# Patient Record
Sex: FEMALE | Race: WHITE | NOT HISPANIC OR LATINO
[De-identification: names, ages, dates, MRNs, and addresses within clinical notes are randomized per-mention and may not be internally consistent; named-entity substitution may affect disease eponyms.]

---

## 2017-01-30 ENCOUNTER — LABORATORY RESULT (OUTPATIENT)
Age: 22
End: 2017-01-30

## 2017-01-30 ENCOUNTER — APPOINTMENT (OUTPATIENT)
Dept: ENDOCRINOLOGY | Facility: CLINIC | Age: 22
End: 2017-01-30

## 2017-01-30 VITALS
DIASTOLIC BLOOD PRESSURE: 78 MMHG | SYSTOLIC BLOOD PRESSURE: 122 MMHG | HEART RATE: 81 BPM | OXYGEN SATURATION: 97 % | HEIGHT: 68.7 IN

## 2017-01-30 RX ORDER — DOCUSATE SODIUM 100 MG/1
100 CAPSULE, LIQUID FILLED ORAL
Qty: 60 | Refills: 0 | Status: COMPLETED | COMMUNITY
Start: 2016-09-23

## 2017-01-30 RX ORDER — PANTOPRAZOLE 20 MG/1
20 TABLET, DELAYED RELEASE ORAL
Qty: 30 | Refills: 0 | Status: COMPLETED | COMMUNITY
Start: 2016-09-23

## 2017-01-30 RX ORDER — METHYLPREDNISOLONE 4 MG/1
4 TABLET ORAL
Qty: 21 | Refills: 0 | Status: COMPLETED | COMMUNITY
Start: 2016-09-25

## 2017-01-30 RX ORDER — HYDROCODONE BITARTRATE AND ACETAMINOPHEN 10; 300 MG/1; MG/1
10-300 TABLET ORAL
Qty: 90 | Refills: 0 | Status: COMPLETED | COMMUNITY
Start: 2016-08-29

## 2017-01-30 RX ORDER — OXYCODONE AND ACETAMINOPHEN 5; 325 MG/1; MG/1
5-325 TABLET ORAL
Qty: 90 | Refills: 0 | Status: COMPLETED | COMMUNITY
Start: 2016-09-23

## 2017-01-30 RX ORDER — ERGOCALCIFEROL 1.25 MG/1
1.25 MG CAPSULE, LIQUID FILLED ORAL
Qty: 4 | Refills: 0 | Status: COMPLETED | COMMUNITY
Start: 2016-09-12

## 2017-01-30 RX ORDER — DIAZEPAM 2 MG/1
2 TABLET ORAL
Qty: 30 | Refills: 0 | Status: COMPLETED | COMMUNITY
Start: 2016-09-23

## 2017-01-30 RX ORDER — SENNOSIDES 8.6 MG
8.6 TABLET ORAL
Qty: 30 | Refills: 0 | Status: COMPLETED | COMMUNITY
Start: 2016-09-23

## 2017-01-31 LAB
25(OH)D3 SERPL-MCNC: 15.8 NG/ML
ALBUMIN SERPL ELPH-MCNC: 4.5 G/DL
ALP BLD-CCNC: 61 U/L
ALT SERPL-CCNC: 28 U/L
ANION GAP SERPL CALC-SCNC: 17 MMOL/L
AST SERPL-CCNC: 23 U/L
BASOPHILS # BLD AUTO: 0.02 K/UL
BASOPHILS NFR BLD AUTO: 0.2 %
BILIRUB SERPL-MCNC: 0.2 MG/DL
BUN SERPL-MCNC: 16 MG/DL
CALCIUM SERPL-MCNC: 10.3 MG/DL
CALCIUM SERPL-MCNC: 10.3 MG/DL
CHLORIDE SERPL-SCNC: 100 MMOL/L
CO2 SERPL-SCNC: 24 MMOL/L
CREAT SERPL-MCNC: 0.77 MG/DL
EOSINOPHIL # BLD AUTO: 0.12 K/UL
EOSINOPHIL NFR BLD AUTO: 1.1 %
ESTRADIOL SERPL-MCNC: 15 PG/ML
FSH SERPL-MCNC: 8.2 IU/L
GLUCOSE SERPL-MCNC: 96 MG/DL
HBA1C MFR BLD HPLC: 5.4 %
HCT VFR BLD CALC: 41.2 %
HGB BLD-MCNC: 13.6 G/DL
IMM GRANULOCYTES NFR BLD AUTO: 0.2 %
LH SERPL-ACNC: 16.6 IU/L
LYMPHOCYTES # BLD AUTO: 2.42 K/UL
LYMPHOCYTES NFR BLD AUTO: 22.3 %
MAN DIFF?: NORMAL
MCHC RBC-ENTMCNC: 28.1 PG
MCHC RBC-ENTMCNC: 33 GM/DL
MCV RBC AUTO: 85.1 FL
MONOCYTES # BLD AUTO: 0.5 K/UL
MONOCYTES NFR BLD AUTO: 4.6 %
NEUTROPHILS # BLD AUTO: 7.78 K/UL
NEUTROPHILS NFR BLD AUTO: 71.6 %
PARATHYROID HORMONE INTACT: 74 PG/ML
PLATELET # BLD AUTO: 221 K/UL
POTASSIUM SERPL-SCNC: 4.3 MMOL/L
PROT SERPL-MCNC: 7.4 G/DL
RBC # BLD: 4.84 M/UL
RBC # FLD: 14.1 %
SODIUM SERPL-SCNC: 141 MMOL/L
T3RU NFR SERPL: 1.01 INDEX
T4 SERPL-MCNC: 9.6 UG/DL
TSH SERPL-ACNC: 0.73 UIU/ML
WBC # FLD AUTO: 10.86 K/UL

## 2017-03-15 ENCOUNTER — RX RENEWAL (OUTPATIENT)
Age: 22
End: 2017-03-15

## 2017-03-16 ENCOUNTER — RX RENEWAL (OUTPATIENT)
Age: 22
End: 2017-03-16

## 2017-05-07 ENCOUNTER — EMERGENCY (EMERGENCY)
Facility: HOSPITAL | Age: 22
LOS: 0 days | Discharge: HOME | End: 2017-05-07
Attending: EMERGENCY MEDICINE
Payer: COMMERCIAL

## 2017-05-07 VITALS
HEIGHT: 69 IN | WEIGHT: 283.96 LBS | OXYGEN SATURATION: 99 % | DIASTOLIC BLOOD PRESSURE: 80 MMHG | TEMPERATURE: 99 F | SYSTOLIC BLOOD PRESSURE: 142 MMHG | RESPIRATION RATE: 18 BRPM | HEART RATE: 83 BPM

## 2017-05-07 DIAGNOSIS — S70.362A INSECT BITE (NONVENOMOUS), LEFT THIGH, INITIAL ENCOUNTER: ICD-10-CM
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
## 2017-05-07 DIAGNOSIS — Z88.0 ALLERGY STATUS TO PENICILLIN: ICD-10-CM

## 2017-05-07 PROCEDURE — 99282 EMERGENCY DEPT VISIT SF MDM: CPT

## 2017-05-07 NOTE — ED PROVIDER NOTE - PHYSICAL EXAMINATION
GEN: Alert, NAD  HEAD: atraumatic, EOMI, PERRL, normal lids/conjunctiva  ENT: normal hearing, patent oropharynx without erythema/exudate, uvula midline  NECK: +supple, no tenderness/meningismus, +Trachea midline  PULM: Bilateral BS, normal resp effort, no wheeze/stridor/retractions  CV: RRR, no M/R/G, +dist ext pulses  ABD: soft, NT/ND  BACK: no CVAT, no midline tenderness  MSK: no edema/erythema/cyanosis  SKIN: no rash  NEURO: AAOx3, no sensory/motor deficits, CN 2-12 intact  undress pt and check for any ticks, none found, no insect bites found

## 2017-05-07 NOTE — ED PROVIDER NOTE - PRESENTING SYMPTOMS DETAILS
21F w hx of leukemia in remission, thyroid dz, hip surg comes in after finding a tick on her L thigh, not sure if she got bit. no rashes/fevers/  ROS: No fever/chills, No photophobia/eye pain/changes in vision, No ear pain/sore throat/dysphagia, No chest pain/palpitations, no SOB/cough/wheeze/stridor, No abdominal pain/N/V/D, no dysuria/frequency/discharge, No neck/back pain, no rash, no changes in neurological status/function.

## 2017-05-07 NOTE — ED ADULT NURSE NOTE - OBJECTIVE STATEMENT
patient stated that she believed that she was bit by a tick and she wants to make sure that everything is ok, no tick noted to left thigh

## 2017-05-07 NOTE — ED ADULT NURSE NOTE - PMH
ALL (acute lymphoblastic leukemia)    Avascular necrosis    Hepatic steatosis    Herniated disc    OCD (obsessive compulsive disorder)    PCOS (polycystic ovarian syndrome)

## 2017-05-07 NOTE — ED PROVIDER NOTE - MEDICAL DECISION MAKING DETAILS
not sure if pt had a tick bite per pt. no rashes. pt is aalready on clarithromycin which covers for lyme and has 7 more days to go (Taking it for sinus infx). pt appears well and non-toxic. . VSS. Sx have improved during ED stay. No acute events during ED observation period. Precautions given to return to the ED if sx persist or change. Pt expresses understanding and has no further questions. Pt feels comfortable and wishes to be discharged home. Instructed to follow up with PMD in 24 hrs.

## 2017-05-23 ENCOUNTER — APPOINTMENT (OUTPATIENT)
Dept: ENDOCRINOLOGY | Facility: CLINIC | Age: 22
End: 2017-05-23

## 2017-05-23 NOTE — ED ADULT TRIAGE NOTE - TEMPERATURE IN FAHRENHEIT (DEGREES F)
Visit Information Date & Time Provider Department Dept. Phone Encounter #  
 5/23/2017  9:00 AM Hallie Arevalo NP [address] [facility] at [facility] [phone] [id number] Your Appointments 5/30/2017  9:00 AM  
ESTABLISHED PATIENT with Jose Vazquez MD  
[address] [facility] at [facility]) Appt Note: onc f/u  
 [address] [facility]  
[phone]  
  
   
 [address]  
  
    
 6/7/2017  8:15 AM  
Any with Hallie Arevalo NP [address] [facility] at [facility]) Appt Note: chemo day [address] [facility]  
[phone]  
  
   
 [address]  
  
    
 6/21/2017  8:15 AM  
Any with Hallie Arevalo NP [address] [facility] at [facility]) Appt Note: chemo day [address] [facility]  
[phone] Upcoming Health Maintenance Date Due Hepatitis C Screening 1946 DTaP/Tdap/Td series (1 - Tdap) 9/17/1966 ZOSTER VACCINE AGE 60> 9/17/2005 GLAUCOMA SCREENING Q2Y 9/17/2010 OSTEOPOROSIS SCREENING (DEXA) 9/17/2010 MEDICARE YEARLY EXAM 9/17/2010 Pneumococcal 65+ High/Highest Risk (2 of 2 - PPSV23) 12/27/2016 BREAST CANCER SCRN MAMMOGRAM 3/9/2017 INFLUENZA AGE 9 TO ADULT 8/1/2017 FOBT Q 1 YEAR AGE 50-75 8/21/2017 Allergies as of 5/23/2017  Review Complete On: 5/23/2017 By: Hallie Arevalo NP Severity Noted Reaction Type Reactions Beef Containing Products High 01/21/2016    Anaphylaxis Heparin (Porcine) High 11/11/2016    Hives Heparin beef starts with hives and can lead to anaphylatic shock  Iodinated Contrast Media - Oral And Iv Dye High 11/16/2016    Rash, Nausea and Vomiting, Other (comments) Abdominal pain Pork/porcine Containing Products High 01/21/2016    Anaphylaxis Augmentin [Amoxicillin-pot Clavulanate] Medium 03/14/2017   Topical Rash Full body rash, NV Bactrim [Sulfamethoprim Ds] Medium 05/01/2013   Side Effect Hives Erythromycin Medium 05/01/2013   Side Effect Hives, Itching Ciprofloxacin  05/01/2013   Intolerance Unknown (comments) Splitting headache Macrobid [Nitrofurantoin Monohyd/m-cryst]  05/01/2015    Other (comments) Headaches, bad dreams Oxaliplatin  04/25/2017    Other (comments) Vocal Cord Paralysis Current Immunizations  Reviewed on 5/10/2017 Name Date Pneumococcal Conjugate (PCV-13) 11/1/2016 Not reviewed this visit You Were Diagnosed With   
  
 Codes Comments Metastatic colon cancer in female [facility])    -  Primary ICD-10-CM: C78.5 ICD-9-CM: 197.5 Facial rash     ICD-10-CM: R21 
ICD-9-CM: 782.1 Vitals BP Pulse Temp Resp Height(growth percentile) Weight(growth percentile) 103/71 76 98.3 °F (36.8 °C) (Oral) 16 5' 5\" (1.651 m) 149 lb (67.6 kg) SpO2 BMI OB Status Smoking Status 99% 24.79 kg/m2 Postmenopausal Former Smoker Vitals History BMI and BSA Data Body Mass Index Body Surface Area 24.79 kg/m 2 1.76 m 2 Preferred Pharmacy Pharmacy Name Phone [facility] [address] [place] [address] [phone] Your Updated Medication List  
  
   
This list is accurate as of: 5/23/17  4:00 PM.  Always use your most recent med list.  
  
  
  
  
 acetaminophen 500 mg tablet Commonly known as:  TYLENOL Take  by mouth every six (6) hours as needed for Pain. ALLEGRA-D 12 HOUR  mg Tb12 Generic drug:  fexofenadine-pseudoephedrine Take 1 Tab by mouth every twelve (12) hours. fluticasone 50 mcg/actuation nasal spray Commonly known as:  Creasie Derwood 2 Sprays by Both Nostrils route daily. methylPREDNISolone 4 mg tablet Commonly known as:  Odalis Will Take as directed  
  
 oxyCODONE IR 5 mg immediate release tablet Commonly known as:  Cierra Cardona Take 1 Tab by mouth every four (4) hours as needed for Pain. Max Daily Amount: 30 mg.  
  
 potassium chloride 20 mEq tablet Commonly known as:  K-DUR, KLOR-CON Take 1 Tab by mouth two (2) times a day. Prescriptions Sent to Pharmacy Refills  
 methylPREDNISolone (MEDROL DOSEPACK) 4 mg tablet 0 Sig: Take as directed Class: Normal  
 Pharmacy: [facility] [address], [address] [place] [address] Ph #: [phone] To-Do List   
 06/07/2017 8:00 AM  
  Appointment with [address] at [facility] ([phone])  
  
 06/09/2017 3:00 PM  
  Appointment with CECELIA FAST TRACK/FLOAT NURSE at [facility] ([phone])  
  
 06/21/2017 8:00 AM  
  Appointment with [address] at [facility] ([phone])  
  
 06/23/2017 3:00 PM  
  Appointment with CECELIA FAST TRACK/FLOAT NURSE at [facility] ([phone]) Introducing [facility] SERVICES! Dear Paola Guan: 
Thank you for requesting a [employer] account. Our records indicate that you already have an active [employer] account. You can access your account anytime at [email or web address] Did you know that you can access your hospital and ER discharge instructions at any time in [employer]? You can also review all of your test results from your hospital stay or ER visit. Additional Information If you have questions, please visit the Frequently Asked Questions section of the Easy [employer] website at [email or web address] Remember, Easy Ice is NOT to be used for urgent needs. For medical emergencies, dial 911. Now available from your iPhone and Android! Please provide this summary of care documentation to your next provider. Your primary care clinician is listed as Bairon Huynh. If you have any questions after today's visit, please call [phone]. 98.7

## 2017-07-13 ENCOUNTER — RX RENEWAL (OUTPATIENT)
Age: 22
End: 2017-07-13

## 2017-08-20 ENCOUNTER — EMERGENCY (EMERGENCY)
Facility: HOSPITAL | Age: 22
LOS: 0 days | Discharge: HOME | End: 2017-08-20
Attending: EMERGENCY MEDICINE
Payer: MEDICAID

## 2017-08-20 VITALS
RESPIRATION RATE: 16 BRPM | WEIGHT: 259.93 LBS | HEIGHT: 69 IN | OXYGEN SATURATION: 96 % | SYSTOLIC BLOOD PRESSURE: 152 MMHG | DIASTOLIC BLOOD PRESSURE: 91 MMHG | TEMPERATURE: 99 F | HEART RATE: 98 BPM

## 2017-08-20 DIAGNOSIS — K76.0 FATTY (CHANGE OF) LIVER, NOT ELSEWHERE CLASSIFIED: ICD-10-CM

## 2017-08-20 DIAGNOSIS — Z88.0 ALLERGY STATUS TO PENICILLIN: ICD-10-CM

## 2017-08-20 DIAGNOSIS — J02.9 ACUTE PHARYNGITIS, UNSPECIFIED: ICD-10-CM

## 2017-08-20 DIAGNOSIS — R07.0 PAIN IN THROAT: ICD-10-CM

## 2017-08-20 DIAGNOSIS — F42.9 OBSESSIVE-COMPULSIVE DISORDER, UNSPECIFIED: ICD-10-CM

## 2017-08-20 DIAGNOSIS — Z88.8 ALLERGY STATUS TO OTHER DRUGS, MEDICAMENTS AND BIOLOGICAL SUBSTANCES STATUS: ICD-10-CM

## 2017-08-20 DIAGNOSIS — C91.00 ACUTE LYMPHOBLASTIC LEUKEMIA NOT HAVING ACHIEVED REMISSION: ICD-10-CM

## 2017-08-20 DIAGNOSIS — E28.2 POLYCYSTIC OVARIAN SYNDROME: ICD-10-CM

## 2017-08-20 PROCEDURE — 99283 EMERGENCY DEPT VISIT LOW MDM: CPT

## 2017-08-20 NOTE — ED PROVIDER NOTE - ENMT, MLM
Airway patent, Nasal mucosa clear. Mouth with normal mucosa. Throat has no vesicles, + oropharyngeal exudates and erythema. Uvula is midline.

## 2018-02-20 ENCOUNTER — LABORATORY RESULT (OUTPATIENT)
Age: 23
End: 2018-02-20

## 2018-02-20 ENCOUNTER — APPOINTMENT (OUTPATIENT)
Dept: ENDOCRINOLOGY | Facility: CLINIC | Age: 23
End: 2018-02-20
Payer: COMMERCIAL

## 2018-02-20 VITALS
BODY MASS INDEX: 42.44 KG/M2 | WEIGHT: 280 LBS | HEART RATE: 88 BPM | HEIGHT: 68 IN | DIASTOLIC BLOOD PRESSURE: 74 MMHG | SYSTOLIC BLOOD PRESSURE: 120 MMHG | OXYGEN SATURATION: 98 %

## 2018-02-20 PROCEDURE — 99214 OFFICE O/P EST MOD 30 MIN: CPT

## 2018-02-20 RX ORDER — NORETHINDRONE ACETATE AND ETHINYL ESTRADIOL 1; 20 MG/1; UG/1
1-20 TABLET ORAL DAILY
Qty: 90 | Refills: 2 | Status: DISCONTINUED | COMMUNITY
Start: 2017-03-16 | End: 2018-02-20

## 2018-02-20 RX ORDER — LIRAGLUTIDE 6 MG/ML
18 INJECTION, SOLUTION SUBCUTANEOUS
Qty: 5 | Refills: 3 | Status: DISCONTINUED | COMMUNITY
Start: 2017-01-30 | End: 2018-02-20

## 2018-02-21 LAB
25(OH)D3 SERPL-MCNC: 18.9 NG/ML
ALBUMIN SERPL ELPH-MCNC: 4.6 G/DL
ALP BLD-CCNC: 75 U/L
ALT SERPL-CCNC: 18 U/L
ANION GAP SERPL CALC-SCNC: 15 MMOL/L
AST SERPL-CCNC: 17 U/L
BASOPHILS # BLD AUTO: 0.02 K/UL
BASOPHILS NFR BLD AUTO: 0.2 %
BILIRUB SERPL-MCNC: 0.3 MG/DL
BUN SERPL-MCNC: 12 MG/DL
CALCIUM SERPL-MCNC: 10.5 MG/DL
CALCIUM SERPL-MCNC: 10.5 MG/DL
CHLORIDE SERPL-SCNC: 102 MMOL/L
CO2 SERPL-SCNC: 28 MMOL/L
CREAT SERPL-MCNC: 0.73 MG/DL
EOSINOPHIL # BLD AUTO: 0.13 K/UL
EOSINOPHIL NFR BLD AUTO: 1.3 %
GLUCOSE SERPL-MCNC: 92 MG/DL
HCT VFR BLD CALC: 42.4 %
HGB BLD-MCNC: 14.1 G/DL
IMM GRANULOCYTES NFR BLD AUTO: 0.2 %
LYMPHOCYTES # BLD AUTO: 1.79 K/UL
LYMPHOCYTES NFR BLD AUTO: 17.8 %
MAN DIFF?: NORMAL
MCHC RBC-ENTMCNC: 28.7 PG
MCHC RBC-ENTMCNC: 33.3 GM/DL
MCV RBC AUTO: 86.2 FL
MONOCYTES # BLD AUTO: 0.58 K/UL
MONOCYTES NFR BLD AUTO: 5.8 %
NEUTROPHILS # BLD AUTO: 7.51 K/UL
NEUTROPHILS NFR BLD AUTO: 74.7 %
PARATHYROID HORMONE INTACT: 80 PG/ML
PLATELET # BLD AUTO: 256 K/UL
POTASSIUM SERPL-SCNC: 4.8 MMOL/L
PROT SERPL-MCNC: 7.9 G/DL
RBC # BLD: 4.92 M/UL
RBC # FLD: 13.2 %
SODIUM SERPL-SCNC: 145 MMOL/L
T3RU NFR SERPL: 1.23 INDEX
T4 SERPL-MCNC: 7.5 UG/DL
TSH SERPL-ACNC: 1.77 UIU/ML
WBC # FLD AUTO: 10.05 K/UL

## 2018-05-15 ENCOUNTER — RX RENEWAL (OUTPATIENT)
Age: 23
End: 2018-05-15

## 2018-12-19 PROBLEM — E28.2 POLYCYSTIC OVARIAN SYNDROME: Chronic | Status: ACTIVE | Noted: 2017-05-07

## 2019-01-22 ENCOUNTER — EMERGENCY (EMERGENCY)
Facility: HOSPITAL | Age: 24
LOS: 0 days | Discharge: HOME | End: 2019-01-22
Attending: EMERGENCY MEDICINE
Payer: COMMERCIAL

## 2019-01-22 VITALS
HEART RATE: 94 BPM | WEIGHT: 285.06 LBS | TEMPERATURE: 98 F | HEIGHT: 69 IN | DIASTOLIC BLOOD PRESSURE: 99 MMHG | RESPIRATION RATE: 20 BRPM | OXYGEN SATURATION: 98 % | SYSTOLIC BLOOD PRESSURE: 153 MMHG

## 2019-01-22 DIAGNOSIS — Z79.52 LONG TERM (CURRENT) USE OF SYSTEMIC STEROIDS: ICD-10-CM

## 2019-01-22 DIAGNOSIS — F42.9 OBSESSIVE-COMPULSIVE DISORDER, UNSPECIFIED: ICD-10-CM

## 2019-01-22 DIAGNOSIS — C91.00 ACUTE LYMPHOBLASTIC LEUKEMIA NOT HAVING ACHIEVED REMISSION: ICD-10-CM

## 2019-01-22 DIAGNOSIS — M54.5 LOW BACK PAIN: ICD-10-CM

## 2019-01-22 DIAGNOSIS — E28.2 POLYCYSTIC OVARIAN SYNDROME: ICD-10-CM

## 2019-01-22 DIAGNOSIS — Z88.0 ALLERGY STATUS TO PENICILLIN: ICD-10-CM

## 2019-01-22 DIAGNOSIS — M54.17 RADICULOPATHY, LUMBOSACRAL REGION: ICD-10-CM

## 2019-01-22 DIAGNOSIS — Z96.643 PRESENCE OF ARTIFICIAL HIP JOINT, BILATERAL: ICD-10-CM

## 2019-01-22 DIAGNOSIS — Z79.1 LONG TERM (CURRENT) USE OF NON-STEROIDAL ANTI-INFLAMMATORIES (NSAID): ICD-10-CM

## 2019-01-22 PROCEDURE — 99283 EMERGENCY DEPT VISIT LOW MDM: CPT

## 2019-01-22 RX ORDER — KETOROLAC TROMETHAMINE 30 MG/ML
30 SYRINGE (ML) INJECTION ONCE
Qty: 0 | Refills: 0 | Status: DISCONTINUED | OUTPATIENT
Start: 2019-01-22 | End: 2019-01-22

## 2019-01-22 RX ORDER — METHOCARBAMOL 500 MG/1
1 TABLET, FILM COATED ORAL
Qty: 10 | Refills: 0 | OUTPATIENT
Start: 2019-01-22 | End: 2019-01-26

## 2019-01-22 RX ORDER — METHOCARBAMOL 500 MG/1
750 TABLET, FILM COATED ORAL ONCE
Qty: 0 | Refills: 0 | Status: COMPLETED | OUTPATIENT
Start: 2019-01-22 | End: 2019-01-22

## 2019-01-22 RX ADMIN — METHOCARBAMOL 750 MILLIGRAM(S): 500 TABLET, FILM COATED ORAL at 14:03

## 2019-01-22 RX ADMIN — Medication 30 MILLIGRAM(S): at 14:03

## 2019-01-22 NOTE — ED ADULT NURSE NOTE - OBJECTIVE STATEMENT
Patient complains of lower back pain, pt has a history of herniated disc and spinal stenosis, bilateral knee, hips and shoulder replacement, hx of leukemia and pcos. 2 back surguries. patient complains of lower back pain. denies pregnancy

## 2019-01-22 NOTE — ED PROVIDER NOTE - MUSCULOSKELETAL, MLM
Spine appears normal, range of motion is not limited, + bilateral paravertebral mid lumbar tenderness

## 2019-01-22 NOTE — ED ADULT TRIAGE NOTE - CHIEF COMPLAINT QUOTE
pt complaining of lower back pain since Sunday. pt has history of 2 back surgeries. denies trauma. states pain medication she was prescribed is not working.

## 2019-01-22 NOTE — ED PROVIDER NOTE - CARE PROVIDER_API CALL
Viviana Pineda), Neurology  [facility] [place], [place] [id number]  Phone: [phone]  Fax: [phone]

## 2019-02-25 ENCOUNTER — RX RENEWAL (OUTPATIENT)
Age: 24
End: 2019-02-25

## 2019-02-27 ENCOUNTER — RX RENEWAL (OUTPATIENT)
Age: 24
End: 2019-02-27

## 2019-03-26 ENCOUNTER — EMERGENCY (EMERGENCY)
Facility: HOSPITAL | Age: 24
LOS: 0 days | Discharge: HOME | End: 2019-03-26
Attending: EMERGENCY MEDICINE
Payer: COMMERCIAL

## 2019-03-26 VITALS
DIASTOLIC BLOOD PRESSURE: 85 MMHG | RESPIRATION RATE: 18 BRPM | WEIGHT: 287.04 LBS | TEMPERATURE: 98 F | HEIGHT: 69 IN | SYSTOLIC BLOOD PRESSURE: 127 MMHG | OXYGEN SATURATION: 99 % | HEART RATE: 92 BPM

## 2019-03-26 VITALS
HEART RATE: 81 BPM | SYSTOLIC BLOOD PRESSURE: 133 MMHG | TEMPERATURE: 98 F | RESPIRATION RATE: 18 BRPM | DIASTOLIC BLOOD PRESSURE: 83 MMHG | OXYGEN SATURATION: 99 %

## 2019-03-26 DIAGNOSIS — M87.9 OSTEONECROSIS, UNSPECIFIED: ICD-10-CM

## 2019-03-26 DIAGNOSIS — S39.012A STRAIN OF MUSCLE, FASCIA AND TENDON OF LOWER BACK, INITIAL ENCOUNTER: ICD-10-CM

## 2019-03-26 DIAGNOSIS — C91.00 ACUTE LYMPHOBLASTIC LEUKEMIA NOT HAVING ACHIEVED REMISSION: ICD-10-CM

## 2019-03-26 DIAGNOSIS — E28.2 POLYCYSTIC OVARIAN SYNDROME: ICD-10-CM
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
## 2019-03-26 DIAGNOSIS — Z96.643 PRESENCE OF ARTIFICIAL HIP JOINT, BILATERAL: ICD-10-CM

## 2019-03-26 DIAGNOSIS — Z88.0 ALLERGY STATUS TO PENICILLIN: ICD-10-CM

## 2019-03-26 DIAGNOSIS — K76.0 FATTY (CHANGE OF) LIVER, NOT ELSEWHERE CLASSIFIED: ICD-10-CM

## 2019-03-26 DIAGNOSIS — F42.9 OBSESSIVE-COMPULSIVE DISORDER, UNSPECIFIED: ICD-10-CM
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
## 2019-03-26 DIAGNOSIS — M54.9 DORSALGIA, UNSPECIFIED: ICD-10-CM

## 2019-03-26 LAB — HCG SERPL-ACNC: <1 MIU/ML — SIGNIFICANT CHANGE UP

## 2019-03-26 PROCEDURE — 99284 EMERGENCY DEPT VISIT MOD MDM: CPT

## 2019-03-26 PROCEDURE — 71045 X-RAY EXAM CHEST 1 VIEW: CPT | Mod: 26

## 2019-03-26 RX ORDER — CYCLOBENZAPRINE HYDROCHLORIDE 10 MG/1
1 TABLET, FILM COATED ORAL
Qty: 15 | Refills: 0
Start: 2019-03-26 | End: 2019-03-30

## 2019-03-26 RX ORDER — IBUPROFEN 200 MG
600 TABLET ORAL ONCE
Qty: 0 | Refills: 0 | Status: COMPLETED | OUTPATIENT
Start: 2019-03-26 | End: 2019-03-26

## 2019-03-26 RX ORDER — TRAMADOL HYDROCHLORIDE 50 MG/1
1 TABLET ORAL
Qty: 12 | Refills: 0 | OUTPATIENT
Start: 2019-03-26 | End: 2019-03-28

## 2019-03-26 RX ORDER — MORPHINE SULFATE 50 MG/1
4 CAPSULE, EXTENDED RELEASE ORAL ONCE
Qty: 0 | Refills: 0 | Status: DISCONTINUED | OUTPATIENT
Start: 2019-03-26 | End: 2019-03-26

## 2019-03-26 RX ORDER — ENOXAPARIN SODIUM 100 MG/ML
0 INJECTION SUBCUTANEOUS
Qty: 0 | Refills: 0 | COMMUNITY

## 2019-03-26 RX ORDER — KETOROLAC TROMETHAMINE 30 MG/ML
30 SYRINGE (ML) INJECTION ONCE
Qty: 0 | Refills: 0 | Status: DISCONTINUED | OUTPATIENT
Start: 2019-03-26 | End: 2019-03-26

## 2019-03-26 RX ORDER — IBUPROFEN 200 MG
1 TABLET ORAL
Qty: 15 | Refills: 0
Start: 2019-03-26 | End: 2019-03-30

## 2019-03-26 RX ORDER — TRAMADOL HYDROCHLORIDE 50 MG/1
50 TABLET ORAL ONCE
Qty: 0 | Refills: 0 | Status: DISCONTINUED | OUTPATIENT
Start: 2019-03-26 | End: 2019-03-26

## 2019-03-26 RX ORDER — METHOCARBAMOL 500 MG/1
1000 TABLET, FILM COATED ORAL ONCE
Qty: 0 | Refills: 0 | Status: COMPLETED | OUTPATIENT
Start: 2019-03-26 | End: 2019-03-26

## 2019-03-26 RX ADMIN — METHOCARBAMOL 1000 MILLIGRAM(S): 500 TABLET, FILM COATED ORAL at 11:19

## 2019-03-26 RX ADMIN — Medication 600 MILLIGRAM(S): at 11:19

## 2019-03-26 RX ADMIN — Medication 30 MILLIGRAM(S): at 13:41

## 2019-03-26 RX ADMIN — MORPHINE SULFATE 4 MILLIGRAM(S): 50 CAPSULE, EXTENDED RELEASE ORAL at 12:04

## 2019-03-26 NOTE — ED PROVIDER NOTE - OBJECTIVE STATEMENT
23 years old female walked in with mom c/o right upper back pain after pull her back while wearing her bra this morning. Pt also sts pain increases with movements and breathing. Pt denies headache, dizziness, blurred visions, light sensitivities, focal/distal weakness or numbness, cough, chest pain, nausea, vomiting, fever, chills, abd pain, dysuria, vaginal spotting or discharge or irregular bowel movement.

## 2019-03-26 NOTE — ED PROVIDER NOTE - PROGRESS NOTE DETAILS
Pt is alert and oriented x 3 sts she is feeling better denies headache, dizziness, sob cough, chest pain, nausea, vomiting, abd pain. Pt ans mom are given and explained the cxr reports and advised to follow up with her doctor as soon as possible.

## 2019-03-26 NOTE — ED PROVIDER NOTE - MUSCULOSKELETAL MINIMAL EXAM
MUSCLE SPASMS/right trapezius muscle tender, Pt is able actively abduct/adduct/flex/extend all joints of bilateral upper extremities against resistance/neck supple

## 2019-03-26 NOTE — ED ADULT NURSE NOTE - OBJECTIVE STATEMENT
Cancer as a child, developed AVN, bilateral hips, bilateral shoulders, and right knee replaced. Patient states having 2 lumber spinal surgeries in the past. Patient c/o of back/ neck pain that began yesterday. Patient states painful to walk. PMH involves cancer as a child, developed AVN, bilateral hips, bilateral shoulders, and right knee replaced. Patient states having 2 lumber spinal surgeries in the past.

## 2019-03-26 NOTE — ED PROVIDER NOTE - CONSTITUTIONAL, MLM
normal... Well appearing, well nourished, awake, alert, oriented to person, place, time/situation and in no apparent distress. Speaking in clear full sentences no  nasal flaring shoulders retractions no diaphoresis appears very comfortable lying in the stretcher in a bright light room.

## 2019-04-04 ENCOUNTER — EMERGENCY (EMERGENCY)
Facility: HOSPITAL | Age: 24
LOS: 0 days | Discharge: HOME | End: 2019-04-04
Attending: EMERGENCY MEDICINE
Payer: COMMERCIAL

## 2019-04-04 VITALS
DIASTOLIC BLOOD PRESSURE: 94 MMHG | TEMPERATURE: 98 F | HEIGHT: 69 IN | HEART RATE: 90 BPM | WEIGHT: 287.04 LBS | RESPIRATION RATE: 20 BRPM | SYSTOLIC BLOOD PRESSURE: 123 MMHG | OXYGEN SATURATION: 99 %

## 2019-04-04 VITALS
OXYGEN SATURATION: 99 % | HEART RATE: 76 BPM | RESPIRATION RATE: 18 BRPM | SYSTOLIC BLOOD PRESSURE: 146 MMHG | TEMPERATURE: 98 F | DIASTOLIC BLOOD PRESSURE: 88 MMHG

## 2019-04-04 DIAGNOSIS — R11.0 NAUSEA: ICD-10-CM

## 2019-04-04 DIAGNOSIS — M54.5 LOW BACK PAIN: ICD-10-CM

## 2019-04-04 LAB
ALBUMIN SERPL ELPH-MCNC: 3.5 G/DL — SIGNIFICANT CHANGE UP (ref 3.3–5)
ALP SERPL-CCNC: 65 U/L — SIGNIFICANT CHANGE UP (ref 40–120)
ALT FLD-CCNC: 18 U/L — SIGNIFICANT CHANGE UP (ref 12–78)
ANION GAP SERPL CALC-SCNC: 9 MMOL/L — SIGNIFICANT CHANGE UP (ref 5–17)
APPEARANCE UR: ABNORMAL
APTT BLD: 30.2 SEC — SIGNIFICANT CHANGE UP (ref 28.5–37)
AST SERPL-CCNC: 15 U/L — SIGNIFICANT CHANGE UP (ref 15–37)
BACTERIA # UR AUTO: ABNORMAL
BASOPHILS # BLD AUTO: 0.03 K/UL — SIGNIFICANT CHANGE UP (ref 0–0.2)
BASOPHILS NFR BLD AUTO: 0.4 % — SIGNIFICANT CHANGE UP (ref 0–2)
BILIRUB SERPL-MCNC: 0.3 MG/DL — SIGNIFICANT CHANGE UP (ref 0.2–1.2)
BILIRUB UR-MCNC: NEGATIVE — SIGNIFICANT CHANGE UP
BUN SERPL-MCNC: 15 MG/DL — SIGNIFICANT CHANGE UP (ref 7–23)
CALCIUM SERPL-MCNC: 8.7 MG/DL — SIGNIFICANT CHANGE UP (ref 8.5–10.1)
CHLORIDE SERPL-SCNC: 105 MMOL/L — SIGNIFICANT CHANGE UP (ref 96–108)
CO2 SERPL-SCNC: 26 MMOL/L — SIGNIFICANT CHANGE UP (ref 22–31)
COLOR SPEC: YELLOW — SIGNIFICANT CHANGE UP
CREAT SERPL-MCNC: 0.72 MG/DL — SIGNIFICANT CHANGE UP (ref 0.5–1.3)
DIFF PNL FLD: ABNORMAL
EOSINOPHIL # BLD AUTO: 0.14 K/UL — SIGNIFICANT CHANGE UP (ref 0–0.5)
EOSINOPHIL NFR BLD AUTO: 1.6 % — SIGNIFICANT CHANGE UP (ref 0–6)
EPI CELLS # UR: ABNORMAL
GLUCOSE SERPL-MCNC: 80 MG/DL — SIGNIFICANT CHANGE UP (ref 70–99)
GLUCOSE UR QL: NEGATIVE MG/DL — SIGNIFICANT CHANGE UP
HCG SERPL-ACNC: <1 MIU/ML — SIGNIFICANT CHANGE UP
HCT VFR BLD CALC: 40.3 % — SIGNIFICANT CHANGE UP (ref 34.5–45)
HGB BLD-MCNC: 13.3 G/DL — SIGNIFICANT CHANGE UP (ref 11.5–15.5)
IMM GRANULOCYTES NFR BLD AUTO: 0.2 % — SIGNIFICANT CHANGE UP (ref 0–1.5)
INR BLD: 1.06 RATIO — SIGNIFICANT CHANGE UP (ref 0.88–1.16)
KETONES UR-MCNC: NEGATIVE — SIGNIFICANT CHANGE UP
LEUKOCYTE ESTERASE UR-ACNC: ABNORMAL
LYMPHOCYTES # BLD AUTO: 1.95 K/UL — SIGNIFICANT CHANGE UP (ref 1–3.3)
LYMPHOCYTES # BLD AUTO: 22.9 % — SIGNIFICANT CHANGE UP (ref 13–44)
MCHC RBC-ENTMCNC: 28.5 PG — SIGNIFICANT CHANGE UP (ref 27–34)
MCHC RBC-ENTMCNC: 33 GM/DL — SIGNIFICANT CHANGE UP (ref 32–36)
MCV RBC AUTO: 86.3 FL — SIGNIFICANT CHANGE UP (ref 80–100)
MONOCYTES # BLD AUTO: 0.35 K/UL — SIGNIFICANT CHANGE UP (ref 0–0.9)
MONOCYTES NFR BLD AUTO: 4.1 % — SIGNIFICANT CHANGE UP (ref 2–14)
NEUTROPHILS # BLD AUTO: 6.03 K/UL — SIGNIFICANT CHANGE UP (ref 1.8–7.4)
NEUTROPHILS NFR BLD AUTO: 70.8 % — SIGNIFICANT CHANGE UP (ref 43–77)
NITRITE UR-MCNC: NEGATIVE — SIGNIFICANT CHANGE UP
NRBC # BLD: 0 /100 WBCS — SIGNIFICANT CHANGE UP (ref 0–0)
PH UR: 5 — SIGNIFICANT CHANGE UP (ref 5–8)
PLATELET # BLD AUTO: 221 K/UL — SIGNIFICANT CHANGE UP (ref 150–400)
POTASSIUM SERPL-MCNC: 4.1 MMOL/L — SIGNIFICANT CHANGE UP (ref 3.5–5.3)
POTASSIUM SERPL-SCNC: 4.1 MMOL/L — SIGNIFICANT CHANGE UP (ref 3.5–5.3)
PROT SERPL-MCNC: 7.5 GM/DL — SIGNIFICANT CHANGE UP (ref 6–8.3)
PROT UR-MCNC: NEGATIVE MG/DL — SIGNIFICANT CHANGE UP
PROTHROM AB SERPL-ACNC: 11.9 SEC — SIGNIFICANT CHANGE UP (ref 10–12.9)
RBC # BLD: 4.67 M/UL — SIGNIFICANT CHANGE UP (ref 3.8–5.2)
RBC # FLD: 12.5 % — SIGNIFICANT CHANGE UP (ref 10.3–14.5)
RBC CASTS # UR COMP ASSIST: SIGNIFICANT CHANGE UP /HPF (ref 0–4)
SODIUM SERPL-SCNC: 140 MMOL/L — SIGNIFICANT CHANGE UP (ref 135–145)
SP GR SPEC: 1.02 — SIGNIFICANT CHANGE UP (ref 1.01–1.02)
UROBILINOGEN FLD QL: NEGATIVE MG/DL — SIGNIFICANT CHANGE UP
WBC # BLD: 8.52 K/UL — SIGNIFICANT CHANGE UP (ref 3.8–10.5)
WBC # FLD AUTO: 8.52 K/UL — SIGNIFICANT CHANGE UP (ref 3.8–10.5)
WBC UR QL: ABNORMAL

## 2019-04-04 PROCEDURE — 99285 EMERGENCY DEPT VISIT HI MDM: CPT | Mod: 25

## 2019-04-04 PROCEDURE — 72131 CT LUMBAR SPINE W/O DYE: CPT | Mod: 26

## 2019-04-04 RX ORDER — TRAMADOL HYDROCHLORIDE 50 MG/1
50 TABLET ORAL ONCE
Qty: 0 | Refills: 0 | Status: DISCONTINUED | OUTPATIENT
Start: 2019-04-04 | End: 2019-04-04

## 2019-04-04 RX ORDER — MORPHINE SULFATE 50 MG/1
4 CAPSULE, EXTENDED RELEASE ORAL ONCE
Qty: 0 | Refills: 0 | Status: DISCONTINUED | OUTPATIENT
Start: 2019-04-04 | End: 2019-04-04

## 2019-04-04 RX ORDER — METOCLOPRAMIDE HCL 10 MG
10 TABLET ORAL ONCE
Qty: 0 | Refills: 0 | Status: COMPLETED | OUTPATIENT
Start: 2019-04-04 | End: 2019-04-04

## 2019-04-04 RX ORDER — KETOROLAC TROMETHAMINE 30 MG/ML
30 SYRINGE (ML) INJECTION ONCE
Qty: 0 | Refills: 0 | Status: DISCONTINUED | OUTPATIENT
Start: 2019-04-04 | End: 2019-04-04

## 2019-04-04 RX ORDER — PANTOPRAZOLE SODIUM 20 MG/1
40 TABLET, DELAYED RELEASE ORAL ONCE
Qty: 0 | Refills: 0 | Status: COMPLETED | OUTPATIENT
Start: 2019-04-04 | End: 2019-04-04

## 2019-04-04 RX ORDER — SODIUM CHLORIDE 9 MG/ML
1000 INJECTION INTRAMUSCULAR; INTRAVENOUS; SUBCUTANEOUS ONCE
Qty: 0 | Refills: 0 | Status: COMPLETED | OUTPATIENT
Start: 2019-04-04 | End: 2019-04-04

## 2019-04-04 RX ADMIN — MORPHINE SULFATE 4 MILLIGRAM(S): 50 CAPSULE, EXTENDED RELEASE ORAL at 08:06

## 2019-04-04 RX ADMIN — MORPHINE SULFATE 4 MILLIGRAM(S): 50 CAPSULE, EXTENDED RELEASE ORAL at 10:37

## 2019-04-04 RX ADMIN — SODIUM CHLORIDE 1000 MILLILITER(S): 9 INJECTION INTRAMUSCULAR; INTRAVENOUS; SUBCUTANEOUS at 07:28

## 2019-04-04 RX ADMIN — SODIUM CHLORIDE 1000 MILLILITER(S): 9 INJECTION INTRAMUSCULAR; INTRAVENOUS; SUBCUTANEOUS at 10:37

## 2019-04-04 RX ADMIN — Medication 30 MILLIGRAM(S): at 11:36

## 2019-04-04 RX ADMIN — Medication 10 MILLIGRAM(S): at 08:06

## 2019-04-04 RX ADMIN — MORPHINE SULFATE 4 MILLIGRAM(S): 50 CAPSULE, EXTENDED RELEASE ORAL at 11:36

## 2019-04-04 RX ADMIN — PANTOPRAZOLE SODIUM 40 MILLIGRAM(S): 20 TABLET, DELAYED RELEASE ORAL at 08:06

## 2019-04-04 NOTE — ED PROVIDER NOTE - MUSCULOSKELETAL MINIMAL EXAM
left para lumbar muscle tender to palp L3 toL5 with old midline surgical scar/TENDERNESS/normal range of motion/neck supple/motor intact

## 2019-04-04 NOTE — ED PROVIDER NOTE - CONSTITUTIONAL, MLM
normal... Well appearing, well nourished, awake, alert, oriented to person, place, time/situation and in no apparent distress. Speaking in clear full sentences no nasal flaring no shoulders retractions no diaphoresis not holding her head/chest/abdomen/back, appears very comfortable lying in the stretcher but able to turn her body to the side without difficulty

## 2019-04-04 NOTE — ED PROVIDER NOTE - NONTENDER LOCATION
right lower quadrant/periumbilical/left costovertebral angle/left lower quadrant/suprapubic/left upper quadrant/right upper quadrant/umbilical/right costovertebral angle

## 2019-04-04 NOTE — ED PROVIDER NOTE - PROGRESS NOTE DETAILS
Pt is ambulating with normal gaits without assists. Pt and mom are given and explained all test reports and advised to follow up with her doctor as soon as possible Pt has no urinary symptoms and no oral antibiotic is indicated now.

## 2019-04-04 NOTE — ED ADULT NURSE NOTE - NSIMPLEMENTINTERV_GEN_ALL_ED
Implemented All Universal Safety Interventions:  [place] to call system. Call bell, personal items and telephone within reach. Instruct patient to call for assistance. Room bathroom lighting operational. Non-slip footwear when patient is off stretcher. Physically safe environment: no spills, clutter or unnecessary equipment. Stretcher in lowest position, wheels locked, appropriate side rails in place.

## 2019-04-04 NOTE — ED PROVIDER NOTE - OBJECTIVE STATEMENT
23 years old female with mom c/o mid lower back pain without radiation  st since two mornings ago increases with movements and very nauseous this morning. Pt sts the pain started when she was getting out ot the couch two mornings ago. Pt sts she has a hx of AVN dur to ALL in the childhood and she is in remission. Pt denies recent hx of trauma, headache, dizziness, blurred visions, light sensitivities, focal/distal weakness or numbness, cough, sob, chest pain, vomiting, fever, chills, abdominal pain, dysuria, hematuria, vaginal spotting or discharge or irregular bowel movements. Pt sts she is not at risk of being pregnant. Pt sts she has a hx of lumbar surgery in the past.

## 2019-04-04 NOTE — ED ADULT NURSE REASSESSMENT NOTE - NS ED NURSE REASSESS COMMENT FT1
Pt received in bed in report alert and oriented from JUNIE middleton. As per MD's orders IV brittni placed blood specimen obtained and sent to the lab. Meds given and tolerated well. Nursing care ongoing and safety maintained.

## 2019-04-04 NOTE — ED ADULT TRIAGE NOTE - CHIEF COMPLAINT QUOTE
Pt c/o intermittent lower back pain, midline, x 2-3 days. hx of AVN secondary to treatment for ALL as a child. No recent trauma. No dysuria or hematuria, fevers or chills. No hx of kidney stones. Took diclofenac and skelaxin at home without relief.

## 2019-04-05 LAB
CULTURE RESULTS: SIGNIFICANT CHANGE UP
SPECIMEN SOURCE: SIGNIFICANT CHANGE UP

## 2019-04-09 ENCOUNTER — APPOINTMENT (OUTPATIENT)
Dept: ENDOCRINOLOGY | Facility: CLINIC | Age: 24
End: 2019-04-09
Payer: COMMERCIAL

## 2019-04-09 VITALS
BODY MASS INDEX: 42.51 KG/M2 | HEART RATE: 86 BPM | HEIGHT: 69 IN | SYSTOLIC BLOOD PRESSURE: 104 MMHG | WEIGHT: 287 LBS | OXYGEN SATURATION: 98 % | DIASTOLIC BLOOD PRESSURE: 76 MMHG

## 2019-04-09 VITALS — BODY MASS INDEX: 42.51 KG/M2 | HEIGHT: 69 IN | WEIGHT: 287 LBS

## 2019-04-09 DIAGNOSIS — N91.5 OLIGOMENORRHEA, UNSPECIFIED: ICD-10-CM

## 2019-04-09 PROCEDURE — 99214 OFFICE O/P EST MOD 30 MIN: CPT

## 2019-04-09 RX ORDER — VENLAFAXINE HCL 50 MG
TABLET ORAL
Refills: 0 | Status: ACTIVE | COMMUNITY

## 2019-04-09 NOTE — PHYSICAL EXAM
[Alert] : alert [No Acute Distress] : no acute distress [Normal Sclera/Conjunctiva] : normal sclera/conjunctiva [PERRL] : pupils equal, round and reactive to light [No Neck Mass] : no neck mass was observed [Normal Oropharynx] : the oropharynx was normal [No LAD] : no lymphadenopathy [Thyroid Not Enlarged] : the thyroid was not enlarged [Supple] : the neck was supple [No Thyroid Nodules] : there were no palpable thyroid nodules [No Accessory Muscle Use] : no accessory muscle use [Clear to Auscultation] : lungs were clear to auscultation bilaterally [Regular Rhythm] : with a regular rhythm [Normal S1, S2] : normal S1 and S2 [Carotids Normal] : carotid pulses were normal with no bruits [Soft] : abdomen soft [Normal Bowel Sounds] : normal bowel sounds [Not Tender] : non-tender [Anterior Cervical Nodes] : anterior cervical nodes [Post Cervical Nodes] : posterior cervical nodes [Normal] : normal and non tender [Normal Gait] : normal gait [No Rash] : no rash [Normal Affect] : the affect was normal [No Tremors] : no tremors [Normal Mood] : the mood was normal [Acne] : no acne [Abdominal Striae] : no abdominal striae [Hirsutism] : no hirsutism [Acanthosis Nigricans] : no acanthosis nigricans [de-identified] : obese [de-identified] : thinning of scalp no loss of hairline or localized alopecia

## 2019-04-09 NOTE — ASSESSMENT
[FreeTextEntry1] : late f/u 23 year-old female with \par \par 1. PCOS: menses regular on OBCP. C/w metformin 750 mg BID. No acanthosis nigricans, no hirsutism. Pt has hair thinning with scalp visibility. Derm recommended Rogaine. I agree with this, no contradiction for thyroid. Pt can consider changing OBCP if she wants to see if this is impacting her hair loss. Will send standard labs, doubt androgen excess or Cushings\par  \par 2. Hypothyroidism: clinically and chemically euthyroid on LT4 150. No h/o nodules or goiter. \par \par 3. Obesity: tried Saxenda did not tolerate. Some wt loss in 2018. \par \par 4. H/o low Vit D: on 50,000 2x/wk. Unclear why her patterns are of malabsorption. All questions answered. Pt can increase dose to 3x/wk if values still low. \par \par Of note, pt has been admitted to the hospital twice for back complications in 2018. \par \par I request labs sent out in the morning to include cortisol to rule out adrenal dysfunction. \par \par f/u in 6 mons.  [Levothyroxine] : The patient was instructed to take Levothyroxine on an empty stomach, separate from vitamins, and wait at least 30 minutes before eating

## 2019-04-09 NOTE — REVIEW OF SYSTEMS
[Recent Weight Loss (___ Lbs)] : recent [unfilled] ~Ulb weight loss [Joint Pain] : joint pain [Depression] : depression [Anxiety] : anxiety [All other systems negative] : All other systems negative [Hair Loss] : hair loss [Fatigue] : no fatigue [Decreased Appetite] : appetite not decreased [Visual Field Defect] : no visual field defect [Dysphagia] : no dysphagia [Blurry Vision] : no blurred vision [Dysphonia] : no dysphonia [Chest Pain] : no chest pain [Palpitations] : no palpitations [Shortness Of Breath] : no shortness of breath [Nausea] : no nausea [Wheezing] : no wheezing was heard [Vomiting] : no vomiting was observed [Irregular Menses] : regular menses [Polyuria] : no polyuria [Acanthosis] : no acanthosis  [Hirsutism] : no hirsutism [Acne] : no acne [Ulcer] : no ulcer [Dry Skin] : no dry skin [Headache] : no headaches [Tremors] : no tremors [Heat Intolerance] : heat tolerant [Polydipsia] : no polydipsia [Cold Intolerance] : cold tolerant [Easy Bleeding] : no ~M tendency for easy bleeding [FreeTextEntry9] : chronic 2/2 h/o AVN

## 2019-04-09 NOTE — END OF VISIT
[FreeTextEntry3] : I, Nathalie Marsh, authored this note working as a medical scribe for Dr. Euceda.  04/09/2019.  1:45PM. \par This note was authored by Nathalie Marsh working as medical scribe for me. I have reviewed, edited, and revised the note as needed. I am in agreement with the exam findings, imaging findings, and treatment plan.  Devin Euceda MD

## 2019-04-09 NOTE — HISTORY OF PRESENT ILLNESS
[FreeTextEntry1] : late f/u for 22 y/o female with PCOS, hypothyroidism, and low vit D. \par \par H/o ALL dx at age 10 she is currently still in remission. Had AVN hips, knees, shoulders from chemoRx and steroids. Had R TKR at [facility] July 2015. No recent bone disease. Prior BMD normal. Had lumbar laminectomy Sept 2016 [facility]. Has back pain, sciatica to the R leg. On Vitamin D replacement. \par \par Menses regular on Ortho-Tricycken. \par c/o hair thinning. No major hirsutism. Saw derm, recommended Rogaine, did not start\par \par Gained 30 lbs in 2016. Tried Saxenda, stopped after few months. Wt in 2019 down from 2016. \par \par Hypothyroidism on LT4 150, no obvious hypothyroid sx.

## 2019-04-18 ENCOUNTER — LABORATORY RESULT (OUTPATIENT)
Age: 24
End: 2019-04-18

## 2019-04-19 LAB
25(OH)D3 SERPL-MCNC: 94.6 NG/ML
ALBUMIN SERPL ELPH-MCNC: 4.7 G/DL
ALP BLD-CCNC: 68 U/L
ALT SERPL-CCNC: 21 U/L
ANION GAP SERPL CALC-SCNC: 18 MMOL/L
AST SERPL-CCNC: 18 U/L
BILIRUB SERPL-MCNC: 0.4 MG/DL
BUN SERPL-MCNC: 14 MG/DL
CALCIUM SERPL-MCNC: 9.9 MG/DL
CALCIUM SERPL-MCNC: 9.9 MG/DL
CHLORIDE SERPL-SCNC: 101 MMOL/L
CO2 SERPL-SCNC: 23 MMOL/L
CREAT SERPL-MCNC: 0.51 MG/DL
DHEA-S SERPL-MCNC: 74.6 UG/DL
ESTIMATED AVERAGE GLUCOSE: 91 MG/DL
GLUCOSE SERPL-MCNC: 63 MG/DL
HBA1C MFR BLD HPLC: 4.8 %
MAGNESIUM SERPL-MCNC: 1.9 MG/DL
PARATHYROID HORMONE INTACT: 52 PG/ML
POTASSIUM SERPL-SCNC: 4.1 MMOL/L
PROT SERPL-MCNC: 7.3 G/DL
SODIUM SERPL-SCNC: 142 MMOL/L
T3RU NFR SERPL: 1.2 TBI
T4 SERPL-MCNC: 11.6 UG/DL
TESTOST SERPL-MCNC: 3.8 NG/DL
TSH SERPL-ACNC: 0.28 UIU/ML
VIT B12 SERPL-MCNC: 501 PG/ML

## 2019-04-23 LAB
CORTIS SAL-MCNC: NORMAL
SHBG SERPL-SCNC: 88 NMOL/L

## 2019-05-28 ENCOUNTER — RX RENEWAL (OUTPATIENT)
Age: 24
End: 2019-05-28

## 2019-06-11 ENCOUNTER — RX CHANGE (OUTPATIENT)
Age: 24
End: 2019-06-11

## 2019-08-02 NOTE — ED ADULT NURSE NOTE - CAS DISCH TRANSFER METHOD
Swelling that gets worse/Pain not relieved by Medications/Wound/Surgical Site with redness, or foul smelling discharge or pus/Numbness, tingling, color or temperature change to extremity/Bleeding that does not stop
Private car

## 2019-10-10 NOTE — ED ADULT TRIAGE NOTE - SPO2 (%)
NOTIFICATION RETURN TO WORK / SCHOOL 
 
10/10/2019 11:47 AM 
 
Ms. Yovany Kraft [facility] [address] To Whom It May Concern: 
 
Yovany Kraft is currently under the care of [facility]. She will return to work/school on: 10/10/2019, or until drainage free for 24 hours. If there are questions or concerns please have the patient contact our office.  
 
 
 
Sincerely, 
 
 
Janki Chau PA-C 
 
                                
 
 99

## 2019-10-28 ENCOUNTER — APPOINTMENT (OUTPATIENT)
Dept: ENDOCRINOLOGY | Facility: CLINIC | Age: 24
End: 2019-10-28
Payer: COMMERCIAL

## 2019-10-28 VITALS
HEIGHT: 69 IN | HEART RATE: 102 BPM | WEIGHT: 280 LBS | DIASTOLIC BLOOD PRESSURE: 80 MMHG | SYSTOLIC BLOOD PRESSURE: 130 MMHG | OXYGEN SATURATION: 98 % | BODY MASS INDEX: 41.47 KG/M2

## 2019-10-28 PROCEDURE — 99214 OFFICE O/P EST MOD 30 MIN: CPT

## 2019-10-28 RX ORDER — PANTOPRAZOLE 20 MG/1
20 TABLET, DELAYED RELEASE ORAL DAILY
Qty: 90 | Refills: 0 | Status: DISCONTINUED | COMMUNITY
Start: 2018-02-20 | End: 2019-10-28

## 2019-10-28 NOTE — HISTORY OF PRESENT ILLNESS
[FreeTextEntry1] : F/u for 25 y/o female with PCOS, hypothyroidism, and low vit D. \par \par H/o ALL dx at age 10 she is currently still in remission. Had AVN hips, knees, shoulders from chemoRx and steroids. [facility]. No recent bone disease. Prior BMD normal. Had lumbar laminectomy Sept 2016 [facility]. Has back pain, sciatica to the R leg. On Vitamin D replacement. \par \par Menses regular on Ortho-Tricycken. Previously c/o hair thinning. No major hirsutism. Saw derm, recommended Rogaine, did not start\par \par Gained 30 lbs in 2016. Tried Saxenda, stopped after few months. Wt in 2019 down from 2016. \par \par Hypothyroidism on LT4 150, no obvious hypothyroid sx.

## 2019-10-28 NOTE — REVIEW OF SYSTEMS
[Recent Weight Loss (___ Lbs)] : recent [unfilled] ~Ulb weight loss [Joint Pain] : joint pain [Hair Loss] : hair loss [Depression] : depression [Anxiety] : anxiety [All other systems negative] : All other systems negative [Fatigue] : no fatigue [Decreased Appetite] : appetite not decreased [Visual Field Defect] : no visual field defect [Blurry Vision] : no blurred vision [Dysphagia] : no dysphagia [Dysphonia] : no dysphonia [Chest Pain] : no chest pain [Palpitations] : no palpitations [Shortness Of Breath] : no shortness of breath [Wheezing] : no wheezing was heard [Nausea] : no nausea [Vomiting] : no vomiting was observed [Polyuria] : no polyuria [Irregular Menses] : regular menses [Acanthosis] : no acanthosis  [Hirsutism] : no hirsutism [Acne] : no acne [Dry Skin] : no dry skin [Ulcer] : no ulcer [Headache] : no headaches [Tremors] : no tremors [Polydipsia] : no polydipsia [Cold Intolerance] : cold tolerant [Heat Intolerance] : heat tolerant [Easy Bleeding] : no ~M tendency for easy bleeding [FreeTextEntry9] : chronic 2/2 h/o AVN

## 2019-10-28 NOTE — PHYSICAL EXAM
[Alert] : alert [No Acute Distress] : no acute distress [Normal Sclera/Conjunctiva] : normal sclera/conjunctiva [PERRL] : pupils equal, round and reactive to light [Normal Oropharynx] : the oropharynx was normal [No Neck Mass] : no neck mass was observed [Supple] : the neck was supple [No LAD] : no lymphadenopathy [Thyroid Not Enlarged] : the thyroid was not enlarged [No Thyroid Nodules] : there were no palpable thyroid nodules [No Accessory Muscle Use] : no accessory muscle use [Clear to Auscultation] : lungs were clear to auscultation bilaterally [Normal S1, S2] : normal S1 and S2 [Regular Rhythm] : with a regular rhythm [Carotids Normal] : carotid pulses were normal with no bruits [Normal Bowel Sounds] : normal bowel sounds [Not Tender] : non-tender [Soft] : abdomen soft [Post Cervical Nodes] : posterior cervical nodes [Anterior Cervical Nodes] : anterior cervical nodes [Normal] : normal and non tender [Normal Gait] : normal gait [No Rash] : no rash [No Tremors] : no tremors [Normal Affect] : the affect was normal [Normal Mood] : the mood was normal [Acne] : no acne [Abdominal Striae] : no abdominal striae [Hirsutism] : no hirsutism [Acanthosis Nigricans] : no acanthosis nigricans [de-identified] : obese [de-identified] : thinning of scalp no loss of hairline or localized alopecia

## 2019-10-28 NOTE — ASSESSMENT
[Levothyroxine] : The patient was instructed to take Levothyroxine on an empty stomach, separate from vitamins, and wait at least 30 minutes before eating [FreeTextEntry1] : F/u 24 year-old female with:\par \par 1. PCOS: Menses regular on OBCP. C/w metformin 750 mg BID. No acanthosis nigricans, no hirsutism. Pt has hair thinning with scalp visibility. Derm recommended Rogaine, continue Rogaine, no contradiction for thyroid. Previously recommended pt can consider changing OBCP if she wants to see if this is impacting her hair loss. Pt was advised to change her dermatologist as she is c/o extreme hair loss. \par  \par 2. Hypothyroidism: Clinically and chemically euthyroid on LT4 150. No h/o nodules or goiter. \par \par 3. Obesity: Tried Saxenda did not tolerate. Some wt loss in 2018. Current wt stable.\par \par 4. H/o low Vit D: Pt on 50,000 3x/wk. Unclear why her patterns are of malabsorption. Pt was recommended to reduce it to 2x/week.\par \par Of note, pt has been admitted to the hospital twice for back complications in 2018. H/o herniated disc.\par \par F/u in 6 mons.

## 2019-10-28 NOTE — END OF VISIT
[FreeTextEntry3] : I, Roderick Delaney, authored this note working as a medical scribe for Dr. Euceda.  10/28/2019.  5:15PM. This note was authored by the medical scribe for me. I have reviewed, edited, and revised the note as needed. I am in agreement with the exam findings, imaging findings, and treatment plan.  Devin Euceda MD

## 2020-01-14 ENCOUNTER — RX RENEWAL (OUTPATIENT)
Age: 25
End: 2020-01-14

## 2020-02-20 ENCOUNTER — EMERGENCY (EMERGENCY)
Facility: HOSPITAL | Age: 25
LOS: 0 days | Discharge: HOME | End: 2020-02-20
Attending: EMERGENCY MEDICINE
Payer: COMMERCIAL

## 2020-02-20 VITALS
OXYGEN SATURATION: 98 % | DIASTOLIC BLOOD PRESSURE: 87 MMHG | TEMPERATURE: 98 F | HEART RATE: 96 BPM | SYSTOLIC BLOOD PRESSURE: 140 MMHG | RESPIRATION RATE: 16 BRPM

## 2020-02-20 VITALS
SYSTOLIC BLOOD PRESSURE: 148 MMHG | OXYGEN SATURATION: 95 % | TEMPERATURE: 97 F | RESPIRATION RATE: 17 BRPM | WEIGHT: 270.07 LBS | HEIGHT: 69 IN | DIASTOLIC BLOOD PRESSURE: 88 MMHG | HEART RATE: 88 BPM

## 2020-02-20 DIAGNOSIS — Z88.0 ALLERGY STATUS TO PENICILLIN: ICD-10-CM

## 2020-02-20 DIAGNOSIS — Z88.8 ALLERGY STATUS TO OTHER DRUGS, MEDICAMENTS AND BIOLOGICAL SUBSTANCES STATUS: ICD-10-CM

## 2020-02-20 DIAGNOSIS — Z88.1 ALLERGY STATUS TO OTHER ANTIBIOTIC AGENTS STATUS: ICD-10-CM

## 2020-02-20 DIAGNOSIS — K08.89 OTHER SPECIFIED DISORDERS OF TEETH AND SUPPORTING STRUCTURES: ICD-10-CM

## 2020-02-20 PROCEDURE — 99283 EMERGENCY DEPT VISIT LOW MDM: CPT

## 2020-02-20 RX ORDER — OXYCODONE AND ACETAMINOPHEN 5; 325 MG/1; MG/1
2 TABLET ORAL ONCE
Refills: 0 | Status: DISCONTINUED | OUTPATIENT
Start: 2020-02-20 | End: 2020-02-20

## 2020-02-20 RX ADMIN — OXYCODONE AND ACETAMINOPHEN 2 TABLET(S): 5; 325 TABLET ORAL at 06:52

## 2020-02-20 RX ADMIN — OXYCODONE AND ACETAMINOPHEN 2 TABLET(S): 5; 325 TABLET ORAL at 06:19

## 2020-02-20 NOTE — ED ADULT NURSE NOTE - OBJECTIVE STATEMENT
Patient had wisdom tooth removal yesterday, very swollen and hurts. took motrin at 0100am pt present to the ED c/o toothache. Pt. had wisdom tooth removal yesterday, very swollen and hurts. pt states "took Motrin at 0100am". pt present to the ED c/o toothache. Pt. had wisdom tooth removal yesterday, very swollen and hurts. pt c/o left lower wisdom tooth removal and pain since yesterday. pt taking clindamycin and IBU. pt states "took Motrin at 0100am". pt also states she is unable to sleep at night. pt denies bleeding, denies drainage, denies drooling, denies SOB. no acute distress noted. will continue to monitor.

## 2020-02-20 NOTE — ED PROVIDER NOTE - NSFOLLOWUPINSTRUCTIONS_ED_ALL_ED_FT
please take medication as prescribed    follow up with your dentist    return to hospital if your symptoms worsen

## 2020-02-20 NOTE — ED PROVIDER NOTE - PHYSICAL EXAMINATION
Gen: Alert, speaking in complete sentences, c/o pain  Head: NC, AT, EOMI, normal lids/conjunctiva  ENT: normal hearing, patent oropharynx, MMM, no bleeding, no purulent drainage from site of tooth extraction, no drooling  Neck: supple, no tenderness/meningismus, Trachea midline  Pulm: Bilateral clear BS, normal resp effort, no wheeze/stridor/retractions  CV: RRR, no M/R/G, +dist pulses  Abd: soft, NT/ND, +BS, no guarding/rebound tenderness  Mskel: no edema/erythema/cyanosis  Skin: no rash  Neuro: no sensory/motor deficits

## 2020-02-20 NOTE — ED ADULT NURSE NOTE - NSIMPLEMENTINTERV_GEN_ALL_ED
Implemented All Universal Safety Interventions:  [place] to call system. Call bell, personal items and telephone within reach. Instruct patient to call for assistance. Room bathroom lighting operational. Non-slip footwear when patient is off stretcher. Physically safe environment: no spills, clutter or unnecessary equipment. Stretcher in lowest position, wheels locked, appropriate side rails in place.

## 2020-02-20 NOTE — ED PROVIDER NOTE - PATIENT PORTAL LINK FT
You can access the FollowMyHealth Patient Portal offered by [facility] by registering at the following website: [email or web address]. By joining [employer]’s FollowMyHealth portal, you will also be able to view your health information using other applications (apps) compatible with our system.

## 2020-02-20 NOTE — ED PROVIDER NOTE - CLINICAL SUMMARY MEDICAL DECISION MAKING FREE TEXT BOX
Pt w pain s/p tooth extraction, no bleeding/purulent drainage.  Pt given dose of pain meds in ED.  Pt given Rx and instructed/cautioned on use. Dc home to follow up w dentist & PMD, instructed to return to ED if sx worsen.

## 2020-02-20 NOTE — ED PROVIDER NOTE - OBJECTIVE STATEMENT
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  Pt states she has been compliant w Clindamycin & ibuprofen, but has been unable to sleep due to pain.  Denies bleeding, purulent drainage, drooling, SOB.    No fever/chills, No photophobia/eye pain/changes in vision, No ear pain/sore throat/dysphagia, No chest pain/palpitations, no SOB/cough/wheeze/stridor, No abdominal pain, No neck/back pain, no rash, no changes in neurological status/function.

## 2020-04-30 ENCOUNTER — APPOINTMENT (OUTPATIENT)
Dept: ENDOCRINOLOGY | Facility: CLINIC | Age: 25
End: 2020-04-30
Payer: COMMERCIAL

## 2020-04-30 VITALS
HEART RATE: 119 BPM | SYSTOLIC BLOOD PRESSURE: 120 MMHG | DIASTOLIC BLOOD PRESSURE: 90 MMHG | OXYGEN SATURATION: 98 % | HEIGHT: 69 IN | TEMPERATURE: 98.4 F | BODY MASS INDEX: 40.88 KG/M2 | WEIGHT: 276 LBS

## 2020-04-30 PROCEDURE — 99215 OFFICE O/P EST HI 40 MIN: CPT

## 2020-04-30 RX ORDER — SPIRONOLACTONE 50 MG/1
50 TABLET ORAL
Refills: 0 | Status: ACTIVE | COMMUNITY

## 2020-04-30 RX ORDER — ASCORBIC ACID 500 MG
TABLET ORAL
Refills: 0 | Status: ACTIVE | COMMUNITY

## 2020-04-30 RX ORDER — ETHYNODIOL DIACETATE AND ETHINYL ESTRADIOL 1 MG-50MCG
KIT ORAL
Refills: 0 | Status: ACTIVE | COMMUNITY

## 2020-04-30 RX ORDER — NORGESTIMATE AND ETHINYL ESTRADIOL 7DAYSX3 28
0.18/0.215/0.25 KIT ORAL
Qty: 3 | Refills: 3 | Status: DISCONTINUED | COMMUNITY
Start: 2017-01-30 | End: 2020-04-30

## 2020-04-30 NOTE — ASSESSMENT
[Levothyroxine] : The patient was instructed to take Levothyroxine on an empty stomach, separate from vitamins, and wait at least 30 minutes before eating [FreeTextEntry1] : F/u 24 year-old female with:\par \par 1. PCOS: Menses regular on OBCP. C/w metformin 750 mg BID. No acanthosis nigricans, no hirsutism. Pt has hair thinning with scalp visibility. Derm recommended Rogaine pt refused. Saw Dr Zadlivar, added Aldactone even though testosterone has been normal.. \par  \par 2. Hypothyroidism: Clinically and chemically euthyroid on LT4 150. No h/o nodules or goiter. \par \par 3. Obesity: Tried Saxenda in the past stopped due to unclear reasons.  Options of therapy including bariatric surgery discussed again.  Can retry Saxenda to see if patient will tolerate and is effective. \par 4. H/o low Vit D: Pt on 50,000 3x/wk. Unclear why her patterns are of malabsorption. check levels\par \par F/u in 3 mons.  [Incretin Mimetic Therapy] : Risks and benefits of incretin mimetic therapy were discussed with the patient including nauseau, pancreatitis and potential risk of medullary thyroid cancer

## 2020-04-30 NOTE — PHYSICAL EXAM
[Alert] : alert [Well Nourished] : well nourished [No Acute Distress] : no acute distress [Well Developed] : well developed [Normal Sclera/Conjunctiva] : normal sclera/conjunctiva [No Proptosis] : no proptosis [EOMI] : extra ocular movement intact [Normal Oropharynx] : the oropharynx was normal [Thyroid Not Enlarged] : the thyroid was not enlarged [No Thyroid Nodules] : no palpable thyroid nodules [Well Healed Scar] : well healed scar [No Respiratory Distress] : no respiratory distress [No Accessory Muscle Use] : no accessory muscle use [Clear to Auscultation] : lungs were clear to auscultation bilaterally [Normal S1, S2] : normal S1 and S2 [Normal Rate] : heart rate was normal [Regular Rhythm] : with a regular rhythm [No Edema] : no peripheral edema [Pedal Pulses Normal] : the pedal pulses are present [Normal Bowel Sounds] : normal bowel sounds [Not Distended] : not distended [Not Tender] : non-tender [Soft] : abdomen soft [Normal Anterior Cervical Nodes] : no anterior cervical lymphadenopathy [Normal Posterior Cervical Nodes] : no posterior cervical lymphadenopathy [No Spinal Tenderness] : no spinal tenderness [Spine Straight] : spine straight [Normal Gait] : normal gait [No Stigmata of Cushings Syndrome] : no stigmata of Cushings Syndrome [Normal Strength/Tone] : muscle strength and tone were normal [No Rash] : no rash [Acanthosis Nigricans] : no acanthosis nigricans [Hirsutism] : no hirsutism [Normal Reflexes] : deep tendon reflexes were 2+ and symmetric [Oriented x3] : oriented to person, place, and time [No Tremors] : no tremors [de-identified] : thinning hair crown

## 2020-04-30 NOTE — HISTORY OF PRESENT ILLNESS
[FreeTextEntry1] : F/u for 23 y/o female with PCOS, hypothyroidism, and low vit D. \par \par H/o ALL dx at age 10 she is currently still in remission. Had AVN hips, knees, shoulders from chemoRx and steroids. Had R TKR at [facility] July 2015. No recent bone disease. Prior BMD normal. Had lumbar laminectomy Sept 2016 [facility]. Has back pain, sciatica to the R leg. On Vitamin D replacement. \par \par Gained 30 lbs in 2016. Tried Saxenda, stopped after few months. Wt in 2019 down from 2016. \par changed OBCP to Jose, Menses regular\par Previously c/o hair thinning. No major hirsutism. Saw derm, recommended Rogaine, did not start saw  Micheal Zaldivar for hair loss, added apironolactone 50 qd, did not tolerate 200 mg \par Hypothyroidism on LT4 150, no obvious hypothyroid sx.

## 2020-05-04 ENCOUNTER — APPOINTMENT (OUTPATIENT)
Dept: PEDIATRIC HEMATOLOGY/ONCOLOGY | Facility: CLINIC | Age: 25
End: 2020-05-04
Payer: COMMERCIAL

## 2020-05-04 ENCOUNTER — RX RENEWAL (OUTPATIENT)
Age: 25
End: 2020-05-04

## 2020-05-04 DIAGNOSIS — Z09 ENCOUNTER FOR FOLLOW-UP EXAMINATION AFTER COMPLETED TREATMENT FOR CONDITIONS OTHER THAN MALIGNANT NEOPLASM: ICD-10-CM

## 2020-05-04 DIAGNOSIS — Z85.9 PERSONAL HISTORY OF MALIGNANT NEOPLASM, UNSPECIFIED: ICD-10-CM

## 2020-05-04 DIAGNOSIS — Z86.79 PERSONAL HISTORY OF OTHER DISEASES OF THE CIRCULATORY SYSTEM: ICD-10-CM

## 2020-05-04 DIAGNOSIS — Z86.39 PERSONAL HISTORY OF OTHER ENDOCRINE, NUTRITIONAL AND METABOLIC DISEASE: ICD-10-CM
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
## 2020-05-04 PROCEDURE — 99215 OFFICE O/P EST HI 40 MIN: CPT | Mod: 95

## 2020-05-05 DIAGNOSIS — K21.9 GASTRO-ESOPHAGEAL REFLUX DISEASE W/OUT ESOPHAGITIS: ICD-10-CM

## 2020-05-05 RX ORDER — PANTOPRAZOLE 20 MG/1
20 TABLET, DELAYED RELEASE ORAL DAILY
Qty: 90 | Refills: 1 | Status: ACTIVE | COMMUNITY
Start: 2020-05-05 | End: 1900-01-01

## 2020-05-05 NOTE — HISTORY OF PRESENT ILLNESS
[Home] : at home, [unfilled] , at the time of the visit. [Other Location: e.g. Home (Enter Location, City,State)___] : at [unfilled] [Patient] : the patient [Self] : self [de-identified] : [age] year-old female now 12 years off-therapy for acute lymphoblastic leukemia. Since her last survivorship visit in 2012, EFREN has been generally well, she had a spinal fusion in 2015 and a right knee replacement in 2016.  EFREN's post-treatment course has been complicated by avascular necrosis, PCOS, hair thinning, irregular menstrual cycles, hypothyroidism, obesity.  \par \par EFREN has been attending [employer], an online program for a BA in education  and has been performing well academically with an IEP in place. Efren has been living at home with her parents and brother and 3 dogs. EFREN has been getting minimal exercise, and reported having a generally healthy diet.\par  \par  [de-identified] : 75 [de-identified] : 100 [de-identified] : 3,000 [de-identified] : YES [de-identified] : YES [de-identified] : YES [de-identified] : YES [de-identified] : YES [de-identified] : YES [de-identified] : YES [de-identified] : YES [de-identified] : YES

## 2020-05-05 NOTE — REVIEW OF SYSTEMS
[Anxiety] : anxiety [Negative] : Allergic/Immunologic [FreeTextEntry9] : chronic back pain and limitations due to avascular necrosis. [de-identified] : hair thinning [de-identified] : a

## 2020-05-05 NOTE — PAST MEDICAL HISTORY
[Regular Cycle Intervals] : periods have been regular [FreeTextEntry2] : with use of oral contraceptives;

## 2020-05-05 NOTE — SOCIAL HISTORY
[Father] : father [Mother] : mother [IEP/504] : currently has an IEP/504 in place [Brother] : brother [College] : College [Oral Contraceptives] : oral contraceptives [FreeTextEntry1] : studying childhood education/special ed

## 2020-05-05 NOTE — PHYSICAL EXAM
[Obese] : obese [No focal deficits] : no focal deficits [Gait normal] : gait normal [Normal] : affect appropriate [100: Normal, no complaints, no evidence of disease.] : 100: Normal, no complaints, no evidence of disease. [de-identified] : limited exam-telehealth visit [de-identified] : limited exam-telehealth visit [de-identified] : limited exam-telehealth visit [de-identified] : limited exam-telehealth visit [de-identified] : limited ROM

## 2020-05-05 NOTE — CONSULT LETTER
[Dear  ___] : Dear  [unfilled], [Please see my note below.] : Please see my note below. [Courtesy Letter:] : I had the pleasure of seeing your patient, [unfilled], in my office today. [Consult Closing:] : Thank you very much for allowing me to participate in the care of this patient.  If you have any questions, please do not hesitate to contact me. [FreeTextEntry2] : Betty Bauman\par [address]\par [place] [address] \par  [FreeTextEntry3] : TRUPTI Navarro\par Family Nurse Practitioner \par [facility] \par Pediatric Hematology/Oncology\par [facility]\par [phone]\par \par \par   \par \par

## 2020-05-06 ENCOUNTER — LABORATORY RESULT (OUTPATIENT)
Age: 25
End: 2020-05-06

## 2020-05-07 LAB
25(OH)D3 SERPL-MCNC: 140 NG/ML
ALBUMIN SERPL ELPH-MCNC: 4.4 G/DL
ALP BLD-CCNC: 73 U/L
ALT SERPL-CCNC: 34 U/L
ANION GAP SERPL CALC-SCNC: 18 MMOL/L
AST SERPL-CCNC: 19 U/L
BILIRUB SERPL-MCNC: 0.3 MG/DL
BUN SERPL-MCNC: 10 MG/DL
CALCIUM SERPL-MCNC: 10.1 MG/DL
CHLORIDE SERPL-SCNC: 101 MMOL/L
CO2 SERPL-SCNC: 23 MMOL/L
CORTIS SERPL-MCNC: 21.1 UG/DL
CREAT SERPL-MCNC: 0.51 MG/DL
FERRITIN SERPL-MCNC: 1222 NG/ML
GLUCOSE SERPL-MCNC: 103 MG/DL
POTASSIUM SERPL-SCNC: 4.2 MMOL/L
PROT SERPL-MCNC: 7 G/DL
SODIUM SERPL-SCNC: 142 MMOL/L
T3 SERPL-MCNC: 187 NG/DL
T3RU NFR SERPL: 1.2 TBI
T4 SERPL-MCNC: 11.3 UG/DL
TESTOST SERPL-MCNC: 7.3 NG/DL
TSH SERPL-ACNC: 0.01 UIU/ML

## 2020-05-08 LAB — SHBG SERPL-SCNC: 168 NMOL/L

## 2020-05-11 LAB — 17OHP SERPL-MCNC: 12 NG/DL

## 2020-06-02 ENCOUNTER — RX RENEWAL (OUTPATIENT)
Age: 25
End: 2020-06-02

## 2020-07-20 ENCOUNTER — TRANSCRIPTION ENCOUNTER (OUTPATIENT)
Age: 25
End: 2020-07-20

## 2020-08-03 ENCOUNTER — APPOINTMENT (OUTPATIENT)
Dept: ENDOCRINOLOGY | Facility: CLINIC | Age: 25
End: 2020-08-03
Payer: COMMERCIAL

## 2020-08-03 ENCOUNTER — LABORATORY RESULT (OUTPATIENT)
Age: 25
End: 2020-08-03

## 2020-08-03 VITALS
DIASTOLIC BLOOD PRESSURE: 70 MMHG | HEIGHT: 69 IN | TEMPERATURE: 97.7 F | HEART RATE: 115 BPM | OXYGEN SATURATION: 98 % | SYSTOLIC BLOOD PRESSURE: 110 MMHG | BODY MASS INDEX: 41.03 KG/M2 | WEIGHT: 277 LBS

## 2020-08-03 PROCEDURE — 99214 OFFICE O/P EST MOD 30 MIN: CPT

## 2020-08-03 RX ORDER — ANTIARTHRITIC COMBINATION NO.2 900 MG
5000 TABLET ORAL
Refills: 0 | Status: DISCONTINUED | COMMUNITY
End: 2020-08-03

## 2020-08-03 RX ORDER — RANITIDINE HYDROCHLORIDE 300 MG/1
TABLET, FILM COATED ORAL
Refills: 0 | Status: DISCONTINUED | COMMUNITY
End: 2020-08-03

## 2020-08-03 RX ORDER — CHROMIUM 200 MCG
TABLET ORAL
Refills: 0 | Status: ACTIVE | COMMUNITY

## 2020-08-03 NOTE — HISTORY OF PRESENT ILLNESS
[FreeTextEntry1] :  \par \par H/o ALL dx at age 10 she is currently still in remission. Had AVN hips, knees, shoulders from chemoRx and steroids. Had R TKR at [facility] July 2015. No recent bone disease. Prior BMD normal. Had lumbar laminectomy Sept 2016 [facility]. Has back pain, sciatica to the R leg. On Vitamin D replacement. \par \par Gained 30 lbs in 2016. Tried Saxenda, stopped after few months. Wt in 2019 down from 2016.   Pt did not restart Saxenda. father was hosp with Covid, felt to upset, worse anxiety\par changed OBCP to [place], Menses regular\par Previously c/o hair thinning. No major hirsutism. Saw derm, recommended Rogaine, did not start saw  Micheal Zaldivar for hair loss, added apironolactone 50 qd, did not tolerate 200 mg \par Hypothyroidism on LT4 137 decreased from 150 for suppressed TSH no change in any clinical symptoms on change in dose.  Denies symptoms of hypothyroidism or hyperthyroidism.\par Blood test showed elevated ferritin.  Patient saw hematology and apparently had genetic studies suggesting heterozygous for hemochromatosis.  Recommended observation\par

## 2020-08-03 NOTE — ASSESSMENT
[Incretin Mimetic Therapy] : Risks and benefits of incretin mimetic therapy were discussed with the patient including nauseau, pancreatitis and potential risk of medullary thyroid cancer [Levothyroxine] : The patient was instructed to take Levothyroxine on an empty stomach, separate from vitamins, and wait at least 30 minutes before eating [FreeTextEntry1] : F/u 25 year-old female with:\par \par 1. PCOS: Menses regular on OBCP. C/w metformin 750 mg BID. No acanthosis nigricans, no hirsutism. Pt has hair thinning with scalp visibility. Derm recommended Rogaine pt refused. Saw Dr Zaldivar, added Aldactone even though testosterone has been normal.. \par  \par 2. Hypothyroidism: Clinically and chemically euthyroid on LT4 137  decreased from 1500. No h/o nodules or goiter. \par \par 3. Obesity: Tried Saxenda in the past stopped due to unclear reasons.  Options of therapy including bariatric surgery discussed again.  Can retry Saxenda to see if patient will tolerate and is effective. \par 4. H/o low Vit D: Pt on 50,000 3x/wk. Unclear why her patterns are of malabsorption. check levels\par \par F/u in 3 mons.

## 2020-08-03 NOTE — PHYSICAL EXAM
[Alert] : alert [Well Developed] : well developed [Well Nourished] : well nourished [No Acute Distress] : no acute distress [Normal Sclera/Conjunctiva] : normal sclera/conjunctiva [EOMI] : extra ocular movement intact [No Proptosis] : no proptosis [Normal Oropharynx] : the oropharynx was normal [No Respiratory Distress] : no respiratory distress [No Thyroid Nodules] : no palpable thyroid nodules [Thyroid Not Enlarged] : the thyroid was not enlarged [Clear to Auscultation] : lungs were clear to auscultation bilaterally [Normal S1, S2] : normal S1 and S2 [No Accessory Muscle Use] : no accessory muscle use [Regular Rhythm] : with a regular rhythm [Normal Rate] : heart rate was normal [No Edema] : no peripheral edema [Normal Bowel Sounds] : normal bowel sounds [Not Tender] : non-tender [Normal Anterior Cervical Nodes] : no anterior cervical lymphadenopathy [Not Distended] : not distended [Soft] : abdomen soft [Normal Posterior Cervical Nodes] : no posterior cervical lymphadenopathy [Spine Straight] : spine straight [No Spinal Tenderness] : no spinal tenderness [Normal Gait] : normal gait [No Stigmata of Cushings Syndrome] : no stigmata of Cushings Syndrome [Acanthosis Nigricans] : no acanthosis nigricans [Normal Strength/Tone] : muscle strength and tone were normal [No Rash] : no rash [No Tremors] : no tremors [Normal Reflexes] : deep tendon reflexes were 2+ and symmetric [Hirsutism] : no hirsutism [Oriented x3] : oriented to person, place, and time [de-identified] : right knee scar

## 2020-08-04 LAB
25(OH)D3 SERPL-MCNC: 68.3 NG/ML
ALBUMIN SERPL ELPH-MCNC: 4.7 G/DL
ALP BLD-CCNC: 71 U/L
ALT SERPL-CCNC: 14 U/L
ANION GAP SERPL CALC-SCNC: 17 MMOL/L
AST SERPL-CCNC: 14 U/L
BASOPHILS # BLD AUTO: 0.04 K/UL
BASOPHILS NFR BLD AUTO: 0.3 %
BILIRUB SERPL-MCNC: <0.2 MG/DL
BUN SERPL-MCNC: 10 MG/DL
CALCIUM SERPL-MCNC: 10.6 MG/DL
CHLORIDE SERPL-SCNC: 101 MMOL/L
CO2 SERPL-SCNC: 21 MMOL/L
CREAT SERPL-MCNC: 0.59 MG/DL
EOSINOPHIL # BLD AUTO: 0.2 K/UL
EOSINOPHIL NFR BLD AUTO: 1.6 %
GLUCOSE SERPL-MCNC: 94 MG/DL
HCT VFR BLD CALC: 43.9 %
HGB BLD-MCNC: 14.6 G/DL
IMM GRANULOCYTES NFR BLD AUTO: 0.3 %
LYMPHOCYTES # BLD AUTO: 2.17 K/UL
LYMPHOCYTES NFR BLD AUTO: 17.5 %
MAN DIFF?: NORMAL
MCHC RBC-ENTMCNC: 29.1 PG
MCHC RBC-ENTMCNC: 33.3 GM/DL
MCV RBC AUTO: 87.5 FL
MONOCYTES # BLD AUTO: 0.48 K/UL
MONOCYTES NFR BLD AUTO: 3.9 %
NEUTROPHILS # BLD AUTO: 9.49 K/UL
NEUTROPHILS NFR BLD AUTO: 76.4 %
PLATELET # BLD AUTO: 292 K/UL
POTASSIUM SERPL-SCNC: 4.2 MMOL/L
PROT SERPL-MCNC: 7.2 G/DL
RBC # BLD: 5.02 M/UL
RBC # FLD: 12.9 %
SODIUM SERPL-SCNC: 139 MMOL/L
T3 SERPL-MCNC: 138 NG/DL
T3RU NFR SERPL: 1.4 TBI
TSH SERPL-ACNC: 1.42 UIU/ML
WBC # FLD AUTO: 12.42 K/UL

## 2020-09-13 ENCOUNTER — EMERGENCY (EMERGENCY)
Facility: HOSPITAL | Age: 25
LOS: 0 days | Discharge: HOME | End: 2020-09-13
Attending: EMERGENCY MEDICINE
Payer: MEDICAID

## 2020-09-13 VITALS
HEART RATE: 87 BPM | SYSTOLIC BLOOD PRESSURE: 156 MMHG | OXYGEN SATURATION: 96 % | HEIGHT: 69 IN | TEMPERATURE: 97 F | RESPIRATION RATE: 19 BRPM | DIASTOLIC BLOOD PRESSURE: 106 MMHG | WEIGHT: 279.99 LBS

## 2020-09-13 VITALS
HEART RATE: 81 BPM | RESPIRATION RATE: 18 BRPM | DIASTOLIC BLOOD PRESSURE: 73 MMHG | SYSTOLIC BLOOD PRESSURE: 144 MMHG | TEMPERATURE: 98 F | OXYGEN SATURATION: 98 %

## 2020-09-13 DIAGNOSIS — M25.579 PAIN IN UNSPECIFIED ANKLE AND JOINTS OF UNSPECIFIED FOOT: ICD-10-CM

## 2020-09-13 DIAGNOSIS — Z79.84 LONG TERM (CURRENT) USE OF ORAL HYPOGLYCEMIC DRUGS: ICD-10-CM

## 2020-09-13 DIAGNOSIS — M25.572 PAIN IN LEFT ANKLE AND JOINTS OF LEFT FOOT: ICD-10-CM

## 2020-09-13 DIAGNOSIS — C91.00 ACUTE LYMPHOBLASTIC LEUKEMIA NOT HAVING ACHIEVED REMISSION: ICD-10-CM

## 2020-09-13 DIAGNOSIS — E28.2 POLYCYSTIC OVARIAN SYNDROME: ICD-10-CM

## 2020-09-13 DIAGNOSIS — M25.50 PAIN IN UNSPECIFIED JOINT: ICD-10-CM

## 2020-09-13 DIAGNOSIS — M87.9 OSTEONECROSIS, UNSPECIFIED: ICD-10-CM

## 2020-09-13 DIAGNOSIS — Z88.0 ALLERGY STATUS TO PENICILLIN: ICD-10-CM

## 2020-09-13 PROCEDURE — 99284 EMERGENCY DEPT VISIT MOD MDM: CPT

## 2020-09-13 PROCEDURE — 73610 X-RAY EXAM OF ANKLE: CPT | Mod: 26,RT

## 2020-09-13 RX ORDER — MORPHINE SULFATE 50 MG/1
2 CAPSULE, EXTENDED RELEASE ORAL ONCE
Refills: 0 | Status: DISCONTINUED | OUTPATIENT
Start: 2020-09-13 | End: 2020-09-13

## 2020-09-13 RX ORDER — LEVOTHYROXINE SODIUM 125 MCG
0 TABLET ORAL
Qty: 0 | Refills: 0 | DISCHARGE

## 2020-09-13 RX ORDER — FOLIC ACID 0.8 MG
1 TABLET ORAL
Qty: 0 | Refills: 0 | DISCHARGE

## 2020-09-13 RX ORDER — ASCORBIC ACID 60 MG
1 TABLET,CHEWABLE ORAL
Qty: 0 | Refills: 0 | DISCHARGE

## 2020-09-13 RX ORDER — ETHYNODIOL DIACETATE AND ETHINYL ESTRADIOL 1 MG-50MCG
1 KIT ORAL
Qty: 0 | Refills: 0 | DISCHARGE

## 2020-09-13 RX ORDER — VENLAFAXINE HCL 75 MG
150 CAPSULE, EXT RELEASE 24 HR ORAL
Qty: 0 | Refills: 0 | DISCHARGE

## 2020-09-13 RX ORDER — KETOROLAC TROMETHAMINE 30 MG/ML
30 SYRINGE (ML) INJECTION ONCE
Refills: 0 | Status: DISCONTINUED | OUTPATIENT
Start: 2020-09-13 | End: 2020-09-13

## 2020-09-13 RX ORDER — DIPHENHYDRAMINE HCL 50 MG
50 CAPSULE ORAL ONCE
Refills: 0 | Status: COMPLETED | OUTPATIENT
Start: 2020-09-13 | End: 2020-09-13

## 2020-09-13 RX ORDER — KETOROLAC TROMETHAMINE 30 MG/ML
15 SYRINGE (ML) INJECTION ONCE
Refills: 0 | Status: DISCONTINUED | OUTPATIENT
Start: 2020-09-13 | End: 2020-09-13

## 2020-09-13 RX ORDER — METFORMIN HYDROCHLORIDE 850 MG/1
0 TABLET ORAL
Qty: 0 | Refills: 0 | DISCHARGE

## 2020-09-13 RX ORDER — ERGOCALCIFEROL 1.25 MG/1
0 CAPSULE ORAL
Qty: 0 | Refills: 0 | DISCHARGE

## 2020-09-13 RX ORDER — IBUPROFEN 200 MG
1 TABLET ORAL
Qty: 28 | Refills: 0
Start: 2020-09-13 | End: 2020-09-19

## 2020-09-13 RX ORDER — HYDROMORPHONE HYDROCHLORIDE 2 MG/ML
1 INJECTION INTRAMUSCULAR; INTRAVENOUS; SUBCUTANEOUS
Qty: 12 | Refills: 0
Start: 2020-09-13 | End: 2020-09-14

## 2020-09-13 RX ADMIN — MORPHINE SULFATE 2 MILLIGRAM(S): 50 CAPSULE, EXTENDED RELEASE ORAL at 05:30

## 2020-09-13 RX ADMIN — Medication 50 MILLIGRAM(S): at 05:30

## 2020-09-13 RX ADMIN — MORPHINE SULFATE 2 MILLIGRAM(S): 50 CAPSULE, EXTENDED RELEASE ORAL at 05:22

## 2020-09-13 RX ADMIN — MORPHINE SULFATE 2 MILLIGRAM(S): 50 CAPSULE, EXTENDED RELEASE ORAL at 04:52

## 2020-09-13 RX ADMIN — Medication 15 MILLIGRAM(S): at 05:22

## 2020-09-13 RX ADMIN — Medication 30 MILLIGRAM(S): at 04:06

## 2020-09-13 RX ADMIN — Medication 15 MILLIGRAM(S): at 04:52

## 2020-09-13 RX ADMIN — Medication 30 MILLIGRAM(S): at 04:36

## 2020-09-13 NOTE — ED PROVIDER NOTE - PHYSICAL EXAMINATION
Gen: Alert, Well appearing. NAD    Head: NC, AT, PERRL, normal lids/conjunctiva   ENT: Bilateral TM WNL, patent oropharynx without erythema/exudate, uvula midline  Neck: supple, no tenderness/meningismus  Pulm: Bilateral clear BS, normal resp effort  CV: RRR, no M/R/G, +dist pulses   Abd: soft, NT/ND, +BS, no guarding/rebound tenderness  Mskel: extremities x4 with normal ROM. no ctl spine ttp. + mild edema and tenderness to ankle, + from of ankle, no erythema. sensation and pulses intact.   Skin: no rash, no bruising  Neuro: AAOx3, no sensory/motor deficits, CN 2-12 intact

## 2020-09-13 NOTE — ED PROVIDER NOTE - PATIENT PORTAL LINK FT
You can access the FollowMyHealth Patient Portal offered by [facility] by registering at the following website: [email or web address]. By joining [employer]’s FollowMyHealth portal, you will also be able to view your health information using other applications (apps) compatible with our system.

## 2020-09-13 NOTE — ED PROVIDER NOTE - OBJECTIVE STATEMENT
25yo female with pmh ALL as child with resultant AVN of mult joints with mult joint replacements, PCOS presents with left ankle pain today. Pt denies trauma. States she has been out of work for past few months due to covid but started in past week again and has been on her feet all day. Pt had tramadol and oxycodone at home which she took but states its not helping. Pt states normally IV toradol helps. pt also ran out of oxycodone.    No fever/chills, No photophobia/eye pain/changes in vision, No ear pain/sore throat/dysphagia, No chest pain/palpitations, no SOB/cough/wheeze/stridor, No abdominal pain, No neck/back pain, no rash, no changes in neurological status/function.

## 2020-09-13 NOTE — ED PROVIDER NOTE - CLINICAL SUMMARY MEDICAL DECISION MAKING FREE TEXT BOX
XRAY without fx but signs of avascular necrosis. pain improved, dc with analgesia, advise fu with ortho XRAY without fx but signs of avascular necrosis. pain improved, dc with analgesia, advise fu with ortho.     benadryl not given for allergy but pt requesting it to help sleep when she gets home. Pt here with mom.

## 2020-09-13 NOTE — ED PROVIDER NOTE - NSFOLLOWUPINSTRUCTIONS_ED_ALL_ED_FT
Return to the emergency department if:   For worsening severe pain, numbness, weakness,   Rest the leg. Apply ice for 15-20 min every hour as needed. Continue with compression bandage. Elevated the affected leg.     Follow up with your orthopedist.     You can use motrin 600mg every 6-8 hours for pain or fever, and/or Tylenol 650 mg every 4 hours for pain/fever (Max 4g/day of tylenol)    You were prescribed hydrocodone (dilaudid) for severe pain.

## 2020-09-18 ENCOUNTER — APPOINTMENT (OUTPATIENT)
Dept: ORTHOPEDIC SURGERY | Facility: CLINIC | Age: 25
End: 2020-09-18
Payer: COMMERCIAL

## 2020-09-18 VITALS — WEIGHT: 280 LBS | BODY MASS INDEX: 41.47 KG/M2 | HEIGHT: 69 IN

## 2020-09-18 DIAGNOSIS — Z87.39 PERSONAL HISTORY OF OTHER DISEASES OF THE MUSCULOSKELETAL SYSTEM AND CONNECTIVE TISSUE: ICD-10-CM

## 2020-09-18 DIAGNOSIS — M87.073: ICD-10-CM

## 2020-09-18 PROCEDURE — 99204 OFFICE O/P NEW MOD 45 MIN: CPT

## 2020-09-18 RX ORDER — DICLOFENAC SODIUM 75 MG/1
75 TABLET, DELAYED RELEASE ORAL
Qty: 1 | Refills: 0 | Status: ACTIVE | COMMUNITY
Start: 2020-09-18 | End: 1900-01-01

## 2020-09-18 RX ORDER — OXYCODONE 5 MG/1
5 TABLET ORAL TWICE DAILY
Qty: 28 | Refills: 0 | Status: ACTIVE | COMMUNITY
Start: 2020-09-18 | End: 1900-01-01

## 2020-09-18 NOTE — HISTORY OF PRESENT ILLNESS
[de-identified] : Ms. EFREN AG is a 25 year female who presents to office complaining of right ankle pain x 1 week. Pain is worse with movement or ambulation on the ankle. There is associated swelling. No history of trauma. Was seen in the ER on 9/13, Xrays showed AVN. She works as .\par \par Of note, patient has h/o Acute lymphocytic leukemia with bilateral hip replacements, bilateral shoulder replacements, right knee replacement, and spinal surgery, all done at \[facility]\"".\par Patient's BMI is 41. She also has Type 2 DM and hypothyroid for which she takes Metformin and Synthroid, respectively.\par \par All review of systems, family history, social history, surgical history, past medical history, medications, and allergies not previously stated as positive are negative. They were reviewed by me today with the patient and documented accordingly.

## 2020-09-18 NOTE — PHYSICAL EXAM
[de-identified] : Constitutional: Well-nourished, well-developed, No acute distress\par Respiratory:  Good respiratory effort, no SOB\par Lymphatic: No regional lymphadenopathy, no lymphedema\par Psychiatric: Pleasant and normal affect, alert and oriented x3\par Skin: Clean dry and intact B/L UE/LE\par Musculoskeletal: normal except where as noted in regional exam\par \par \par Left ankle:\par APPEARANCE: no marked deformities, no swelling or malalignment\par POSITIVE TENDERNESS: none\par NONTENDER: medial malleolus, lateral malleolus, tibialis posterior tendon, achilles tendon, no marked thickening of tendon, ATFL, CFL, PTFL, anterior tibiofibular ligament (high ankle), sinus tarsus, deltoid ligaments, 5th metatarsal. \par RANGE OF MOTION: full & painless. \par RESISTIVE TESTING: painless resisted dorsiflexion, plantar flexion, inversion & eversion. \par SPECIAL TESTS: neg anterior drawer. neg talar tilt. neg Kleiger's\par NEURO: Normal sensation of LE\par PULSES: 2+ DP/PT pulses\par \par Right ankle:\par APPEARANCE: + Moderate swelling, no marked deformities or malalignment\par POSITIVE TENDERNESS: Medial ankle, deltoid ligament complex, anterior ankle mortise tibialis posterior tendon\par NONTENDER: medial malleolus, lateral malleolus, achilles tendon, no marked thickening of tendon, ATFL, CFL, PTFL, anterior tibiofibular ligament (high ankle), sinus tarsus,5th metatarsal. \par RANGE OF MOTION: full with pain at end ranges of motion \par RESISTIVE TESTING: Painful 4/5 inversion/eversion, painless resisted dorsiflexion, plantar flexion\par SPECIAL TESTS: neg anterior drawer. neg talar tilt. neg Kleiger's\par NEURO: Normal sensation of LE\par PULSES: 2+ DP/PT pulses\par  [de-identified] : I reviewed and clinically correlated the following outside imaging studies,\par EXAM: ANKLE COMPLETE MIN 3 VWS RT \par \par \par PROCEDURE DATE: 09/13/2020 \par \par \par \par INTERPRETATION: CLINICAL STATEMENT: Medial pain \par \par TECHNIQUE: AP, lateral and oblique views of the right ankle. \par \par COMPARISON: None. \par \par FINDINGS: \par \par There is no acute fracture or dislocation. Joint spaces are intact. There is bone infarction in the distal tibia and questionably within the calcaneus.. \par \par Soft tissues are remarkable for medial swelling \par \par IMPRESSION: \par \par Bone infarction distal tibia and question of additional area of infarction in the posterior calcaneus. Medial soft tissue swelling \par \par

## 2020-09-24 ENCOUNTER — APPOINTMENT (OUTPATIENT)
Dept: ORTHOPEDIC SURGERY | Facility: CLINIC | Age: 25
End: 2020-09-24

## 2020-11-03 ENCOUNTER — APPOINTMENT (OUTPATIENT)
Dept: ENDOCRINOLOGY | Facility: CLINIC | Age: 25
End: 2020-11-03

## 2021-01-14 ENCOUNTER — RX RENEWAL (OUTPATIENT)
Age: 26
End: 2021-01-14

## 2021-04-22 ENCOUNTER — RX RENEWAL (OUTPATIENT)
Age: 26
End: 2021-04-22

## 2021-05-03 ENCOUNTER — RX RENEWAL (OUTPATIENT)
Age: 26
End: 2021-05-03

## 2021-05-03 ENCOUNTER — APPOINTMENT (OUTPATIENT)
Dept: PEDIATRIC HEMATOLOGY/ONCOLOGY | Facility: CLINIC | Age: 26
End: 2021-05-03
Payer: COMMERCIAL

## 2021-05-03 VITALS
HEART RATE: 88 BPM | TEMPERATURE: 96.8 F | BODY MASS INDEX: 43.04 KG/M2 | SYSTOLIC BLOOD PRESSURE: 116 MMHG | HEIGHT: 68.86 IN | WEIGHT: 290.57 LBS | DIASTOLIC BLOOD PRESSURE: 74 MMHG

## 2021-05-03 DIAGNOSIS — Z92.21 PERSONAL HISTORY OF ANTINEOPLASTIC CHEMOTHERAPY: ICD-10-CM

## 2021-05-03 DIAGNOSIS — Z08 ENCOUNTER FOR FOLLOW-UP EXAMINATION AFTER COMPLETED TREATMENT FOR MALIGNANT NEOPLASM: ICD-10-CM

## 2021-05-03 DIAGNOSIS — G89.4 CHRONIC PAIN SYNDROME: ICD-10-CM

## 2021-05-03 DIAGNOSIS — Z91.89 OTHER SPECIFIED PERSONAL RISK FACTORS, NOT ELSEWHERE CLASSIFIED: ICD-10-CM

## 2021-05-03 DIAGNOSIS — M87.00 IDIOPATHIC ASEPTIC NECROSIS OF UNSPECIFIED BONE: ICD-10-CM

## 2021-05-03 DIAGNOSIS — Z85.6 PERSONAL HISTORY OF LEUKEMIA: ICD-10-CM

## 2021-05-03 PROCEDURE — 99215 OFFICE O/P EST HI 40 MIN: CPT

## 2021-05-03 PROCEDURE — 99072 ADDL SUPL MATRL&STAF TM PHE: CPT

## 2021-05-04 PROBLEM — Z08 ENCOUNTER FOR ROUTINE CANCER FOLLOW-UP: Status: ACTIVE | Noted: 2020-03-26

## 2021-05-04 PROBLEM — Z92.21 HISTORY OF CYTOXAN EXPOSURE: Status: ACTIVE | Noted: 2020-05-05

## 2021-05-04 PROBLEM — Z91.89 AT RISK FOR CARDIOMYOPATHY: Status: ACTIVE | Noted: 2020-05-05

## 2021-05-04 PROBLEM — Z92.21 PERSONAL HISTORY OF CHEMOTHERAPY: Status: ACTIVE | Noted: 2020-05-05

## 2021-05-04 NOTE — SOCIAL HISTORY
[Mother] : mother [Father] : father [Brother] : brother [College] : College [IEP/504] : currently has an IEP/504 in place [Oral Contraceptives] : oral contraceptives [FreeTextEntry1] : studying

## 2021-05-04 NOTE — CONSULT LETTER
[Dear  ___] : Dear  [unfilled], [Courtesy Letter:] : I had the pleasure of seeing your patient, [unfilled], in my office today. [Please see my note below.] : Please see my note below. [Consult Closing:] : Thank you very much for allowing me to participate in the care of this patient.  If you have any questions, please do not hesitate to contact me. [FreeTextEntry2] : Betty Bauman\par [address]\[place] [address] \par  [FreeTextEntry3] : TRUPTI Navarro\par Family Nurse Practitioner \par [facility] \par Pediatric Hematology/Oncology\par [facility]\par [phone]\par \par \par   \par \par

## 2021-05-04 NOTE — REVIEW OF SYSTEMS
[Anxiety] : anxiety [Negative] : Allergic/Immunologic [FreeTextEntry9] : chronic back pain [de-identified] : hair thinning

## 2021-05-04 NOTE — HISTORY OF PRESENT ILLNESS
[de-identified] : 25.7 year-old female now 12.9 years off-therapy for acute lymphoblastic leukemia. Since her last survivorship visit in May of 2020, EFREN has been generally well with reports  of chronic back pain, prior to that visit she was last seen in 2012. She had a spinal fusion in 2015 and a right knee replacement in 2016 related to her avascular necrosis.  EFREN's post-treatment course has been complicated by avascular necrosis of bilateral hips, shoulders and knees, PCOS, hair thinning, irregular menstrual cycles, hypothyroidism, obesity, obsessive compulsive disorder and depression .  She is followed by endocrinology.  Patient reports having COVID igg antibodies, but had no obvious signs of infection.  Her parents did have COVID.\par \par EFREN has been attending [employer], an online program for a BA in ,  and has been performing well academically with an IEP in place. Efren is fluent in sign language. Efren has been living at home with her parents and brother and 3 dogs. EFREN has been getting minimal exercise, and reported having a well rounded diet.\par  \par  [de-identified] : 75 [de-identified] : 100 [de-identified] : 3,000 [de-identified] : YES [de-identified] : YES [de-identified] : YES [de-identified] : YES [de-identified] : YES [de-identified] : YES [de-identified] : YES [de-identified] : YES [de-identified] : YES

## 2021-05-04 NOTE — PHYSICAL EXAM
Dr. Juan Bruno notified about diabetic care, accuchecks per order. [Obese] : obese [No focal deficits] : no focal deficits [Gait normal] : gait normal [Normal] : No murmurs, rubs, gallops [Supraclavicular Lymph Nodes Enlarged Bilaterally] : supraclavicular [Cervical Lymph Nodes Enlarged Posterior Bilaterally] : posterior cervical [de-identified] : no obvious cataracts  [de-identified] : limited ROM to back related to pain. [90: Able to carry normal activity; minor signs or symptoms of disease.] : 90: Able to carry normal activity; minor signs or symptoms of disease.

## 2021-05-07 ENCOUNTER — NON-APPOINTMENT (OUTPATIENT)
Age: 26
End: 2021-05-07

## 2021-05-28 NOTE — DISCUSSION/SUMMARY
[de-identified] : Discussed findings of today's exam and possible causes of patient's pain.  Educated patient on their most probable diagnosis of atraumatic right ankle pain, with evidence of avascular necrosis on x-ray.  Reviewed possible courses of treatment, and we collaboratively decided best course of treatment at this time will include conservative management.  Patient has history of multiple episodes of avascular necrosis in other joints of her body, patient has had to undergo bilateral total hip arthroplasty as well as right knee arthroplasty.  Patient now has new onset of atraumatic right ankle pain with evidence of avascular necrosis on x-ray.  At this time recommend MRI of the right ankle to evaluate for extent of avascular necrosis and assistance in potential surgical decision making.  Patient advised to elevate her ankle above the level of her heart to address persistent swelling, recommend topical ice as well.  Patient started on a course of oral NSAIDs, prescription given for diclofenac (We discussed all possible side effects of this medication).  Patient is requesting a stronger medication to address severe pain episodes as well as to address pain at night waking her up from sleep.  Patient has already tried tramadol without any relief.  She was given Dilaudid by the emergency room but has no further tablets of that medication.  A prescription of tramadol has been prescribed, I informed the patient that this is a narcotic pain medication and while it is of low potency, it still causes sedation and it should not be taken while operating machinery, or while caring for children/family members alone.  I also advised the patient that all narcotic pain medications have addictive potential and therefore should be taken as little as possible, and for the shortest duration needed (We discussed all possible side effects of this medication).  Patient advised we can review MRI results over the phone, but as a nonsurgical doctor I would recommend she have a consultation with my foot/ankle surgical associate to discuss potential need for surgical intervention.  Patient appreciates and agrees with current plan.\par \par This note was generated using dragon medical dictation software.  A reasonable effort has been made for proofreading its contents, but typos may still remain.  If there are any questions or points of clarification needed please notify my office.\par \par Using the website [email or web address]., the [place] prescription monitoring program registry was searched for this patient. The confidential drug utilization report was reviewed prior to the controlled substance prescription being given to the patient. Reference #: [id number]\par 
Never smoker

## 2021-06-09 ENCOUNTER — RX RENEWAL (OUTPATIENT)
Age: 26
End: 2021-06-09

## 2021-07-09 ENCOUNTER — RX RENEWAL (OUTPATIENT)
Age: 26
End: 2021-07-09

## 2021-10-18 ENCOUNTER — APPOINTMENT (OUTPATIENT)
Dept: ENDOCRINOLOGY | Facility: CLINIC | Age: 26
End: 2021-10-18
Payer: COMMERCIAL

## 2021-10-18 VITALS
BODY MASS INDEX: 43.45 KG/M2 | HEART RATE: 88 BPM | WEIGHT: 293 LBS | OXYGEN SATURATION: 98 % | TEMPERATURE: 97.2 F | SYSTOLIC BLOOD PRESSURE: 122 MMHG | DIASTOLIC BLOOD PRESSURE: 82 MMHG

## 2021-10-18 PROCEDURE — 99214 OFFICE O/P EST MOD 30 MIN: CPT

## 2021-10-19 NOTE — END OF VISIT
[FreeTextEntry3] : I, Que Hair, authored this note working as a medical scribe for Dr. Euceda.  10/18/2021.  5:30PM. This note was authored by the medical scribe for me. I have reviewed, edited, and revised the note as needed. I am in agreement with the exam findings, imaging findings, and treatment plan.  Devin Euceda MD

## 2021-10-19 NOTE — HISTORY OF PRESENT ILLNESS
[FreeTextEntry1] : No significant interval health changes. 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 Had AVN hips, knees, shoulders from chemoRx and steroids. Had R TKR at [facility] July 2015. No recent bone disease. Prior BMD normal. Had lumbar laminectomy Sept 2016 [facility]. Has back pain, sciatica to the R leg. On Vitamin D 62657 IU 3x/weekly.\par \par Gained 30 lbs in 2016. Tried Saxenda, stopped after few months. Wt in 2019 down from 2016. Pt did not restart Saxenda. Father was hospital with COVID, felt too upset, worsened anxiety.\par \par Hypothyroidism, on LT4 137 mcg daily decreased from 150 for suppressed TSH no change in any clinical symptoms on change in dose. No local neck pain. No dysphagia or dysphonia. No raciness, shakiness, heat/cold intolerance, tiredness, or fatigue. No palpitations, tremors, or sudden weight gain/loss. No h/o goiter or nodules.\par \par Changed OBCP to Kelnor, menses regular.\par \par Previously c/o hair thinning. No major hirsutism. Saw derm, recommended Rogaine, did not start saw Dr. Micheal Zaldivar for hair loss, added spironolactone 50 qd, did not tolerate 200 mg. She later f/u w/ another derm, added Minoxidil pill. No change in hair loss yet.\par \par Prior blood test showed elevated ferritin. Patient saw hematology and apparently had genetic studies suggesting heterozygous for hemochromatosis. Recommended observation.

## 2021-10-19 NOTE — PHYSICAL EXAM
[Alert] : alert [Well Nourished] : well nourished [No Acute Distress] : no acute distress [Well Developed] : well developed [Normal Sclera/Conjunctiva] : normal sclera/conjunctiva [EOMI] : extra ocular movement intact [No Proptosis] : no proptosis [Thyroid Not Enlarged] : the thyroid was not enlarged [No Thyroid Nodules] : no palpable thyroid nodules [Clear to Auscultation] : lungs were clear to auscultation bilaterally [Normal S1, S2] : normal S1 and S2 [Normal Rate] : heart rate was normal [Regular Rhythm] : with a regular rhythm [Normal Bowel Sounds] : normal bowel sounds [No Edema] : no peripheral edema [Not Tender] : non-tender [Not Distended] : not distended [Soft] : abdomen soft [Normal Anterior Cervical Nodes] : no anterior cervical lymphadenopathy [No Spinal Tenderness] : no spinal tenderness [Spine Straight] : spine straight [No Stigmata of Cushings Syndrome] : no stigmata of Cushings Syndrome [Normal Gait] : normal gait [Normal Reflexes] : deep tendon reflexes were 2+ and symmetric [No Tremors] : no tremors [Oriented x3] : oriented to person, place, and time [Hirsutism] : no hirsutism [de-identified] : Thinning hair crown but no true alopecia or loss of hairline

## 2021-10-19 NOTE — ASSESSMENT
[Levothyroxine] : The patient was instructed to take Levothyroxine on an empty stomach, separate from vitamins, and wait at least 30 minutes before eating [FreeTextEntry1] : F/u 26 year-old female with:\par \par . PCOS: Menses regular on OBCP. C/w metformin 750 mg BID. No acanthosis nigricans, no hirsutism. Pt has hair thinning with scalp visibility. Derm recommended Rogaine pt refused. Saw Dr Zaldivar, added Aldactone even though testosterone has been normal.\par  Recommend topical Rogaine\par . Hypothyroidism: Clinically and chemically euthyroid on LT4 137 mcg daily. No local neck pain. No dysphagia or dysphonia. No raciness, shakiness, heat/cold intolerance, tiredness, or fatigue. No palpitations, tremors, or sudden weight gain/loss.\par \par . Obesity: Tried Saxenda in the past stopped due to worsened anxiety. Options of therapy including bariatric surgery discussed but not preferred. Again discussed use of GLP1s such as Saxenda or Wegovy for weight loss in addition to diet and exercise. Risks and benefits discussed. All questions were answered. Pt agrees to try Saxenda again. Prescription sent.\par \par . H/o low Vitamin D: Pt on 50,000 IU 3x/wk. Unclear why her patterns are of malabsorption.\par \par Discussed use of topical minoxidil (Rogaine) for more hair growth in a concentrated area.\par \par Call Dr. Betty Bauman at [facility] for labs.\par \par Request morning labs sent out.\par \par F/u in 3 months

## 2021-10-23 ENCOUNTER — LABORATORY RESULT (OUTPATIENT)
Age: 26
End: 2021-10-23

## 2021-10-29 LAB
25(OH)D3 SERPL-MCNC: 62 NG/ML
ALBUMIN SERPL ELPH-MCNC: 4.4 G/DL
ALP BLD-CCNC: 72 U/L
ALT SERPL-CCNC: 15 U/L
ANION GAP SERPL CALC-SCNC: 23 MMOL/L
AST SERPL-CCNC: 15 U/L
BILIRUB SERPL-MCNC: 0.2 MG/DL
BUN SERPL-MCNC: 12 MG/DL
CALCIUM SERPL-MCNC: 9.8 MG/DL
CHLORIDE SERPL-SCNC: 106 MMOL/L
CO2 SERPL-SCNC: 17 MMOL/L
CREAT SERPL-MCNC: 0.6 MG/DL
DHEA-S SERPL-MCNC: 58.9 UG/DL
ESTIMATED AVERAGE GLUCOSE: 94 MG/DL
GLUCOSE SERPL-MCNC: 90 MG/DL
HBA1C MFR BLD HPLC: 4.9 %
POTASSIUM SERPL-SCNC: 4.8 MMOL/L
PROT SERPL-MCNC: 7 G/DL
SODIUM SERPL-SCNC: 146 MMOL/L
T3RU NFR SERPL: 1.4 TBI
T4 SERPL-MCNC: 8.4 UG/DL
TESTOST SERPL-MCNC: 10.3 NG/DL
TSH SERPL-ACNC: 1.76 UIU/ML

## 2021-11-04 ENCOUNTER — RX RENEWAL (OUTPATIENT)
Age: 26
End: 2021-11-04

## 2022-02-22 ENCOUNTER — RX RENEWAL (OUTPATIENT)
Age: 27
End: 2022-02-22

## 2022-03-08 ENCOUNTER — RX CHANGE (OUTPATIENT)
Age: 27
End: 2022-03-08

## 2022-03-09 ENCOUNTER — RX CHANGE (OUTPATIENT)
Age: 27
End: 2022-03-09

## 2022-04-06 ENCOUNTER — RX CHANGE (OUTPATIENT)
Age: 27
End: 2022-04-06

## 2022-06-19 ENCOUNTER — RX RENEWAL (OUTPATIENT)
Age: 27
End: 2022-06-19

## 2022-07-18 ENCOUNTER — RX RENEWAL (OUTPATIENT)
Age: 27
End: 2022-07-18

## 2022-07-18 RX ORDER — CHOLECALCIFEROL (VITAMIN D3) 1250 MCG
1.25 MG CAPSULE ORAL
Qty: 36 | Refills: 0 | Status: ACTIVE | COMMUNITY
Start: 2019-06-11 | End: 1900-01-01

## 2022-09-14 NOTE — ED ADULT NURSE NOTE - SUICIDE SCREENING QUESTION 1
PAST SURGICAL HISTORY:  History of ovarian cyst No PAST SURGICAL HISTORY:  Cataract, left eye 8/31/2022    H/O retinal detachment surgery 1987--Right eye    History of removal of ovarian cyst 8/1986--??right    Right cataract 8/3/2022

## 2022-10-25 NOTE — ED PROVIDER NOTE - CROS ED ENMT ALL NEG
----- Message from Leland Gann Psychiatric hospital, [facility] Pre Admit Testing Dept.  [phone]  Mon-Fri 7:30 am- 4 pm   Cardiac          negative...

## 2022-11-23 ENCOUNTER — RX RENEWAL (OUTPATIENT)
Age: 27
End: 2022-11-23

## 2022-12-23 ENCOUNTER — EMERGENCY (EMERGENCY)
Facility: HOSPITAL | Age: 27
LOS: 0 days | Discharge: HOME | End: 2022-12-23
Attending: STUDENT IN AN ORGANIZED HEALTH CARE EDUCATION/TRAINING PROGRAM

## 2022-12-23 VITALS
TEMPERATURE: 99 F | DIASTOLIC BLOOD PRESSURE: 84 MMHG | RESPIRATION RATE: 20 BRPM | OXYGEN SATURATION: 98 % | HEART RATE: 90 BPM | SYSTOLIC BLOOD PRESSURE: 137 MMHG

## 2022-12-23 VITALS
RESPIRATION RATE: 16 BRPM | OXYGEN SATURATION: 97 % | HEIGHT: 69 IN | DIASTOLIC BLOOD PRESSURE: 90 MMHG | HEART RATE: 91 BPM | TEMPERATURE: 98 F | WEIGHT: 281.09 LBS | SYSTOLIC BLOOD PRESSURE: 119 MMHG

## 2022-12-23 DIAGNOSIS — Z88.0 ALLERGY STATUS TO PENICILLIN: ICD-10-CM

## 2022-12-23 DIAGNOSIS — M25.571 PAIN IN RIGHT ANKLE AND JOINTS OF RIGHT FOOT: ICD-10-CM

## 2022-12-23 DIAGNOSIS — Z88.2 ALLERGY STATUS TO SULFONAMIDES: ICD-10-CM

## 2022-12-23 DIAGNOSIS — Z79.84 LONG TERM (CURRENT) USE OF ORAL HYPOGLYCEMIC DRUGS: ICD-10-CM

## 2022-12-23 DIAGNOSIS — M90.571: ICD-10-CM

## 2022-12-23 DIAGNOSIS — Z88.1 ALLERGY STATUS TO OTHER ANTIBIOTIC AGENTS STATUS: ICD-10-CM

## 2022-12-23 LAB
ALBUMIN SERPL ELPH-MCNC: 3.4 G/DL — SIGNIFICANT CHANGE UP (ref 3.3–5)
ALP SERPL-CCNC: 73 U/L — SIGNIFICANT CHANGE UP (ref 40–120)
ALT FLD-CCNC: 24 U/L — SIGNIFICANT CHANGE UP (ref 12–78)
ANION GAP SERPL CALC-SCNC: 8 MMOL/L — SIGNIFICANT CHANGE UP (ref 5–17)
AST SERPL-CCNC: 22 U/L — SIGNIFICANT CHANGE UP (ref 15–37)
BASOPHILS # BLD AUTO: 0.06 K/UL — SIGNIFICANT CHANGE UP (ref 0–0.2)
BASOPHILS NFR BLD AUTO: 0.3 % — SIGNIFICANT CHANGE UP (ref 0–2)
BILIRUB SERPL-MCNC: 0.4 MG/DL — SIGNIFICANT CHANGE UP (ref 0.2–1.2)
BUN SERPL-MCNC: 13 MG/DL — SIGNIFICANT CHANGE UP (ref 7–23)
CALCIUM SERPL-MCNC: 9.5 MG/DL — SIGNIFICANT CHANGE UP (ref 8.5–10.1)
CHLORIDE SERPL-SCNC: 104 MMOL/L — SIGNIFICANT CHANGE UP (ref 96–108)
CO2 SERPL-SCNC: 25 MMOL/L — SIGNIFICANT CHANGE UP (ref 22–31)
CREAT SERPL-MCNC: 0.62 MG/DL — SIGNIFICANT CHANGE UP (ref 0.5–1.3)
EGFR: 124 ML/MIN/1.73M2 — SIGNIFICANT CHANGE UP
EOSINOPHIL # BLD AUTO: 0.12 K/UL — SIGNIFICANT CHANGE UP (ref 0–0.5)
EOSINOPHIL NFR BLD AUTO: 0.7 % — SIGNIFICANT CHANGE UP (ref 0–6)
GLUCOSE SERPL-MCNC: 91 MG/DL — SIGNIFICANT CHANGE UP (ref 70–99)
HCG SERPL-ACNC: <1 MIU/ML — SIGNIFICANT CHANGE UP
HCT VFR BLD CALC: 39.4 % — SIGNIFICANT CHANGE UP (ref 34.5–45)
HGB BLD-MCNC: 13.1 G/DL — SIGNIFICANT CHANGE UP (ref 11.5–15.5)
IMM GRANULOCYTES NFR BLD AUTO: 0.5 % — SIGNIFICANT CHANGE UP (ref 0–0.9)
LYMPHOCYTES # BLD AUTO: 14.1 % — SIGNIFICANT CHANGE UP (ref 13–44)
LYMPHOCYTES # BLD AUTO: 2.44 K/UL — SIGNIFICANT CHANGE UP (ref 1–3.3)
MCHC RBC-ENTMCNC: 28.3 PG — SIGNIFICANT CHANGE UP (ref 27–34)
MCHC RBC-ENTMCNC: 33.2 G/DL — SIGNIFICANT CHANGE UP (ref 32–36)
MCV RBC AUTO: 85.1 FL — SIGNIFICANT CHANGE UP (ref 80–100)
MONOCYTES # BLD AUTO: 0.66 K/UL — SIGNIFICANT CHANGE UP (ref 0–0.9)
MONOCYTES NFR BLD AUTO: 3.8 % — SIGNIFICANT CHANGE UP (ref 2–14)
NEUTROPHILS # BLD AUTO: 13.9 K/UL — HIGH (ref 1.8–7.4)
NEUTROPHILS NFR BLD AUTO: 80.6 % — HIGH (ref 43–77)
NRBC # BLD: 0 /100 WBCS — SIGNIFICANT CHANGE UP (ref 0–0)
PLATELET # BLD AUTO: 348 K/UL — SIGNIFICANT CHANGE UP (ref 150–400)
POTASSIUM SERPL-MCNC: 4.3 MMOL/L — SIGNIFICANT CHANGE UP (ref 3.5–5.3)
POTASSIUM SERPL-SCNC: 4.3 MMOL/L — SIGNIFICANT CHANGE UP (ref 3.5–5.3)
PROT SERPL-MCNC: 8 GM/DL — SIGNIFICANT CHANGE UP (ref 6–8.3)
RBC # BLD: 4.63 M/UL — SIGNIFICANT CHANGE UP (ref 3.8–5.2)
RBC # FLD: 13.1 % — SIGNIFICANT CHANGE UP (ref 10.3–14.5)
SODIUM SERPL-SCNC: 137 MMOL/L — SIGNIFICANT CHANGE UP (ref 135–145)
WBC # BLD: 17.27 K/UL — HIGH (ref 3.8–10.5)
WBC # FLD AUTO: 17.27 K/UL — HIGH (ref 3.8–10.5)

## 2022-12-23 PROCEDURE — 73610 X-RAY EXAM OF ANKLE: CPT | Mod: 26,RT

## 2022-12-23 PROCEDURE — 99284 EMERGENCY DEPT VISIT MOD MDM: CPT

## 2022-12-23 RX ORDER — MORPHINE SULFATE 50 MG/1
2 CAPSULE, EXTENDED RELEASE ORAL ONCE
Refills: 0 | Status: DISCONTINUED | OUTPATIENT
Start: 2022-12-23 | End: 2022-12-23

## 2022-12-23 RX ORDER — OXYCODONE HYDROCHLORIDE 5 MG/1
5 TABLET ORAL ONCE
Refills: 0 | Status: DISCONTINUED | OUTPATIENT
Start: 2022-12-23 | End: 2022-12-23

## 2022-12-23 RX ORDER — DIPHENHYDRAMINE HCL 50 MG
50 CAPSULE ORAL ONCE
Refills: 0 | Status: COMPLETED | OUTPATIENT
Start: 2022-12-23 | End: 2022-12-23

## 2022-12-23 RX ORDER — KETOROLAC TROMETHAMINE 30 MG/ML
15 SYRINGE (ML) INJECTION ONCE
Refills: 0 | Status: DISCONTINUED | OUTPATIENT
Start: 2022-12-23 | End: 2022-12-23

## 2022-12-23 RX ORDER — DICLOFENAC SODIUM 75 MG/1
3 TABLET, DELAYED RELEASE ORAL
Qty: 30 | Refills: 0
Start: 2022-12-23 | End: 2022-12-27

## 2022-12-23 RX ORDER — DICLOFENAC SODIUM 75 MG/1
1 TABLET, DELAYED RELEASE ORAL
Qty: 15 | Refills: 0
Start: 2022-12-23 | End: 2022-12-27

## 2022-12-23 RX ORDER — OXYCODONE HYDROCHLORIDE 5 MG/1
1 TABLET ORAL
Qty: 12 | Refills: 0
Start: 2022-12-23 | End: 2022-12-25

## 2022-12-23 RX ADMIN — MORPHINE SULFATE 2 MILLIGRAM(S): 50 CAPSULE, EXTENDED RELEASE ORAL at 20:49

## 2022-12-23 RX ADMIN — Medication 15 MILLIGRAM(S): at 21:19

## 2022-12-23 RX ADMIN — MORPHINE SULFATE 2 MILLIGRAM(S): 50 CAPSULE, EXTENDED RELEASE ORAL at 21:19

## 2022-12-23 RX ADMIN — MORPHINE SULFATE 2 MILLIGRAM(S): 50 CAPSULE, EXTENDED RELEASE ORAL at 21:34

## 2022-12-23 RX ADMIN — Medication 15 MILLIGRAM(S): at 20:49

## 2022-12-23 RX ADMIN — OXYCODONE HYDROCHLORIDE 5 MILLIGRAM(S): 5 TABLET ORAL at 22:37

## 2022-12-23 RX ADMIN — MORPHINE SULFATE 2 MILLIGRAM(S): 50 CAPSULE, EXTENDED RELEASE ORAL at 22:04

## 2022-12-23 RX ADMIN — Medication 50 MILLIGRAM(S): at 22:37

## 2022-12-23 NOTE — ED PROVIDER NOTE - NSFOLLOWUPINSTRUCTIONS_ED_ALL_ED_FT
Follow up with orthopedics outpatient. You can follow up with your orthopedist or the one provided below.    You can take diclofenac as needed for pain. Take oxycodone only for severe pain.    Rest, ice, elevate the ankle.    If you have new or worsening symptoms, numbness, tingling, or other acute issues please return to the ED.

## 2022-12-23 NOTE — ED PROVIDER NOTE - PHYSICAL EXAMINATION
GENERAL: Awake, alert, NAD  HEENT: NC/AT, moist mucous membranes  LUNGS: CTAB, no wheezes or crackles   CARDIAC: RRR, no m/r/g  EXT: R ankle is mildly swollen, 2+ DP pulses, mild tenderness overlying the L aspect of R ankle, full ROM with some pain  NEURO: A&Ox3. Moving all extremities.  SKIN: Warm and dry. No rash.  PSYCH: Normal affect.

## 2022-12-23 NOTE — ED ADULT NURSE NOTE - OBJECTIVE STATEMENT
Pt AAOx4. 27 year old female presents to ED with complaint of right ankle pain that began today. Patient has a history of avascular necrosis and states she is having a flare up; last flare up was a year ago per pt. She tried taking anti-inflammatory medications at home with no relief. Denies chest pain, shortness of breath, nausea/vomiting. Sensation intact. Swelling noted in right ankle. Respirations equal and unlabored. No acute distress noted at this time. Mom at bedside. PMH: ALL

## 2022-12-23 NOTE — ED ADULT NURSE NOTE - NSICDXPASTMEDICALHX_GEN_ALL_CORE_FT
PAST MEDICAL HISTORY:  ALL (acute lymphoblastic leukemia)     Avascular necrosis     Hepatic steatosis     Herniated disc     OCD (obsessive compulsive disorder)     PCOS (polycystic ovarian syndrome)

## 2022-12-23 NOTE — ED PROVIDER NOTE - PROGRESS NOTE DETAILS
Discussed with ortho, chronic changes in ankle, patient can follow up with ortho outpatient. Splinting is not necessary. Pain is controlled in the ED, will discharge with pain medication. Patient aware of results.

## 2022-12-23 NOTE — ED PROVIDER NOTE - CLINICAL SUMMARY MEDICAL DECISION MAKING FREE TEXT BOX
25 y/o F with PMH ALL, AVN of multiple joints with replacement, PCOS, presenting to the ED c/o R ankle pain. Vitals stable. Patient is well appearing in NAD. Concern for AVN of ankle. Exam with swelling and tenderness to the R ankle. Will obtain XR and basic labs, treat pain, may require ortho consult.

## 2022-12-23 NOTE — ED PROVIDER NOTE - PATIENT PORTAL LINK FT
You can access the FollowMyHealth Patient Portal offered by [facility] by registering at the following website: [email or web address]. By joining [employer]’s FollowMyHealth portal, you will also be able to view your health information using other applications (apps) compatible with our system.

## 2022-12-23 NOTE — ED PROVIDER NOTE - CARE PROVIDER_API CALL
Gabe Rodriguez)  [facility]  [address], [address]  [place] [address]  Phone: [phone]  Fax: [phone]  Follow Up Time: 1-3 Days

## 2022-12-23 NOTE — ED PROVIDER NOTE - OBJECTIVE STATEMENT
23 y/o F with PMH ALL, AVN of multiple joints with replacement, PCOS, presenting to the ED c/o R ankle pain. Patient states she is having a flare of her AVN and has been having R ankle pain for the past day. She tried using antiinflammatories at home without relief. No trauma. She denies fever or chills. No other pain.

## 2022-12-27 RX ORDER — DICLOFENAC SODIUM 75 MG/1
1 TABLET, DELAYED RELEASE ORAL
Qty: 28 | Refills: 0
Start: 2022-12-27 | End: 2023-01-09

## 2023-02-27 ENCOUNTER — RX RENEWAL (OUTPATIENT)
Age: 28
End: 2023-02-27

## 2023-03-27 ENCOUNTER — LABORATORY RESULT (OUTPATIENT)
Age: 28
End: 2023-03-27

## 2023-03-27 ENCOUNTER — APPOINTMENT (OUTPATIENT)
Dept: ENDOCRINOLOGY | Facility: CLINIC | Age: 28
End: 2023-03-27
Payer: COMMERCIAL

## 2023-03-27 VITALS
OXYGEN SATURATION: 98 % | SYSTOLIC BLOOD PRESSURE: 140 MMHG | WEIGHT: 277 LBS | HEIGHT: 69 IN | HEART RATE: 90 BPM | BODY MASS INDEX: 41.03 KG/M2 | DIASTOLIC BLOOD PRESSURE: 80 MMHG

## 2023-03-27 DIAGNOSIS — E66.01 MORBID (SEVERE) OBESITY DUE TO EXCESS CALORIES: ICD-10-CM

## 2023-03-27 PROCEDURE — 99214 OFFICE O/P EST MOD 30 MIN: CPT

## 2023-03-28 PROBLEM — E66.01 MORBID OBESITY WITH BMI OF 40.0-44.9, ADULT: Status: ACTIVE | Noted: 2020-04-30

## 2023-03-28 RX ORDER — PREDNISONE 20 MG/1
20 TABLET ORAL
Qty: 5 | Refills: 0 | Status: COMPLETED | COMMUNITY
Start: 2023-01-11

## 2023-03-28 RX ORDER — MINOXIDIL 2.5 MG/1
2.5 TABLET ORAL
Qty: 90 | Refills: 0 | Status: ACTIVE | COMMUNITY
Start: 2023-01-31

## 2023-03-28 RX ORDER — LIRAGLUTIDE 6 MG/ML
18 INJECTION, SOLUTION SUBCUTANEOUS DAILY
Qty: 3 | Refills: 3 | Status: DISCONTINUED | COMMUNITY
Start: 2020-04-30 | End: 2023-03-28

## 2023-03-28 NOTE — PHYSICAL EXAM
[Alert] : alert [Well Nourished] : well nourished [No Acute Distress] : no acute distress [Well Developed] : well developed [Normal Sclera/Conjunctiva] : normal sclera/conjunctiva [EOMI] : extra ocular movement intact [No Proptosis] : no proptosis [Thyroid Not Enlarged] : the thyroid was not enlarged [No Thyroid Nodules] : no palpable thyroid nodules [Clear to Auscultation] : lungs were clear to auscultation bilaterally [Normal S1, S2] : normal S1 and S2 [Normal Rate] : heart rate was normal [Regular Rhythm] : with a regular rhythm [No Edema] : no peripheral edema [Normal Bowel Sounds] : normal bowel sounds [Not Tender] : non-tender [Not Distended] : not distended [Soft] : abdomen soft [Normal Anterior Cervical Nodes] : no anterior cervical lymphadenopathy [No Spinal Tenderness] : no spinal tenderness [Spine Straight] : spine straight [No Stigmata of Cushings Syndrome] : no stigmata of Cushings Syndrome [Normal Gait] : normal gait [Normal Reflexes] : deep tendon reflexes were 2+ and symmetric [No Tremors] : no tremors [Oriented x3] : oriented to person, place, and time [Hirsutism] : no hirsutism [de-identified] : Thinning hair crown but no true alopecia or loss of hairline

## 2023-03-28 NOTE — HISTORY OF PRESENT ILLNESS
[FreeTextEntry1] : No significant interval health changes. No interval surgery, hospitalizations, fractures, \par \par H/o ALL dx at age 10 she is currently still in remission. Had AVN hips, knees, shoulders from chemoRx and steroids. Had [facility] TKR at [facility] July 2015. No recent bone disease. Prior BMD normal. Had lumbar laminectomy Sept 2016 [facility]. Has back pain, sciatica to the R leg. On Vitamin D 90646 IU 3x/weekly.\par \par Gained 30 lbs in 2016. Tried Saxenda, stopped after few months. \par \par Hypothyroidism, on LT4 137 mcg daily decreased from 150 for suppressed TSH no change in any clinical symptoms on change in dose. No local neck pain. No dysphagia or dysphonia. No raciness, shakiness, heat/cold intolerance, tiredness, or fatigue. No palpitations, tremors, or sudden weight gain/loss. No h/o goiter or nodules.\par \par Changed OBCP to Kelnor, menses regular.\par Seeing dermatology for hair thinning.  Has tried PRP injections currently on oral minoxidil from dermatology\par Family history patient's mother had parathyroidectomy for hyperparathyroidism details not available\par \par Previously c/o hair thinning. No major hirsutism. Saw derm, recommended Rogaine, did not start saw Dr. Micehal Zaldivar for hair loss, added spironolactone 50 qd, did not tolerate 200 mg. She later f/u w/ another derm, added Minoxidil pill. No change in hair loss yet.\par \par Prior blood test showed elevated ferritin. Patient saw hematology and apparently had genetic studies suggesting heterozygous for hemochromatosis. Recommended observation.

## 2023-03-28 NOTE — ASSESSMENT
[Levothyroxine] : The patient was instructed to take Levothyroxine on an empty stomach, separate from vitamins, and wait at least 30 minutes before eating [FreeTextEntry1] : 27 year-old female with:\par \par . PCOS: Menses regular on OBCP. C/w metformin 750 mg BID. No acanthosis nigricans, no hirsutism. Pt has hair thinning with scalp visibility. \par  Recommend topical Rogaine.  Patient has started oral minoxidil from a dermatologist.\par . Hypothyroidism: Clinically and chemically euthyroid on LT4 137 mcg daily. No local neck pain. No dysphagia or dysphonia. No raciness, shakiness, heat/cold intolerance, tiredness, or fatigue. No palpitations, tremors, or sudden weight gain/loss.\par \par . Obesity: Tried Saxenda in the past stopped due to worsened anxiety. Options of therapy including bariatric surgery discussed but not preferred. Again discussed use of GLP1s such as Mounjaro or Wegovy for weight loss in addition to diet and exercise. Risks and benefits discussed. All questions were answered.  Patient to consider.  We will try to investigate potential insurance coverage.\par \par . H/o low Vitamin D: Pt on 50,000 IU 3x/wk. Unclear why her patterns are of malabsorption.\par .\par   labs sent out.\par \par F/u in 6 months

## 2023-03-29 DIAGNOSIS — E55.9 VITAMIN D DEFICIENCY, UNSPECIFIED: ICD-10-CM

## 2023-03-31 LAB
ALBUMIN SERPL ELPH-MCNC: 4.6 G/DL
ALP BLD-CCNC: 76 U/L
ALT SERPL-CCNC: 31 U/L
ANION GAP SERPL CALC-SCNC: 14 MMOL/L
AST SERPL-CCNC: 23 U/L
BILIRUB SERPL-MCNC: 0.3 MG/DL
BUN SERPL-MCNC: 17 MG/DL
CALCIUM SERPL-MCNC: 10.8 MG/DL
CALCIUM SERPL-MCNC: 10.8 MG/DL
CHLORIDE SERPL-SCNC: 99 MMOL/L
CO2 SERPL-SCNC: 25 MMOL/L
CREAT SERPL-MCNC: 0.63 MG/DL
EGFR: 124 ML/MIN/1.73M2
ESTIMATED AVERAGE GLUCOSE: 103 MG/DL
GLUCOSE SERPL-MCNC: 79 MG/DL
HBA1C MFR BLD HPLC: 5.2 %
MENADIONE SERPL-MCNC: 0.88 NG/ML
PARATHYROID HORMONE INTACT: 68 PG/ML
POTASSIUM SERPL-SCNC: 4.6 MMOL/L
PROT SERPL-MCNC: 7.7 G/DL
SODIUM SERPL-SCNC: 139 MMOL/L
T3RU NFR SERPL: 1.1 TBI
T4 SERPL-MCNC: 11.6 UG/DL
TSH SERPL-ACNC: 0.31 UIU/ML

## 2023-05-15 ENCOUNTER — RX RENEWAL (OUTPATIENT)
Age: 28
End: 2023-05-15

## 2023-08-14 ENCOUNTER — NON-APPOINTMENT (OUTPATIENT)
Age: 28
End: 2023-08-14

## 2023-08-18 NOTE — ED ADULT TRIAGE NOTE - HEART RATE (BEATS/MIN)
Patient is requesting a refill on medication listed below            zolpidem (AMBIEN) 5 MG tablet [[id number]]    [facility] [id number] [place], [place] - [address]

## 2023-09-27 ENCOUNTER — APPOINTMENT (OUTPATIENT)
Dept: ENDOCRINOLOGY | Facility: CLINIC | Age: 28
End: 2023-09-27

## 2023-10-23 ENCOUNTER — NON-APPOINTMENT (OUTPATIENT)
Age: 28
End: 2023-10-23

## 2023-10-27 ENCOUNTER — RX RENEWAL (OUTPATIENT)
Age: 28
End: 2023-10-27

## 2023-11-27 ENCOUNTER — NON-APPOINTMENT (OUTPATIENT)
Age: 28
End: 2023-11-27

## 2024-01-29 ENCOUNTER — APPOINTMENT (OUTPATIENT)
Dept: ENDOCRINOLOGY | Facility: CLINIC | Age: 29
End: 2024-01-29
Payer: COMMERCIAL

## 2024-01-29 VITALS
WEIGHT: 246 LBS | DIASTOLIC BLOOD PRESSURE: 90 MMHG | OXYGEN SATURATION: 99 % | HEIGHT: 70 IN | BODY MASS INDEX: 35.22 KG/M2 | HEART RATE: 80 BPM | SYSTOLIC BLOOD PRESSURE: 112 MMHG

## 2024-01-29 DIAGNOSIS — E21.0 PRIMARY HYPERPARATHYROIDISM: ICD-10-CM

## 2024-01-29 DIAGNOSIS — L65.9 NONSCARRING HAIR LOSS, UNSPECIFIED: ICD-10-CM

## 2024-01-29 DIAGNOSIS — E28.2 POLYCYSTIC OVARIAN SYNDROME: ICD-10-CM

## 2024-01-29 DIAGNOSIS — E03.9 HYPOTHYROIDISM, UNSPECIFIED: ICD-10-CM

## 2024-01-29 PROCEDURE — 99214 OFFICE O/P EST MOD 30 MIN: CPT

## 2024-01-30 PROBLEM — L65.9 HAIR THINNING: Status: ACTIVE | Noted: 2019-04-09

## 2024-01-30 LAB
24R-OH-CALCIDIOL SERPL-MCNC: 38.7 PG/ML
25(OH)D3 SERPL-MCNC: 43.1 NG/ML
ALBUMIN SERPL ELPH-MCNC: 4.5 G/DL
ALP BLD-CCNC: 82 U/L
ALT SERPL-CCNC: 18 U/L
ANION GAP SERPL CALC-SCNC: 11 MMOL/L
AST SERPL-CCNC: 17 U/L
BILIRUB SERPL-MCNC: 0.2 MG/DL
BUN SERPL-MCNC: 14 MG/DL
CALCIUM SERPL-MCNC: 9.9 MG/DL
CALCIUM SERPL-MCNC: 9.9 MG/DL
CHLORIDE SERPL-SCNC: 98 MMOL/L
CO2 SERPL-SCNC: 26 MMOL/L
CREAT SERPL-MCNC: 0.63 MG/DL
EGFR: 123 ML/MIN/1.73M2
GLUCOSE SERPL-MCNC: 93 MG/DL
PARATHYROID HORMONE INTACT: 73 PG/ML
PHOSPHATE SERPL-MCNC: 4.2 MG/DL
POTASSIUM SERPL-SCNC: 4.6 MMOL/L
PROT SERPL-MCNC: 7.4 G/DL
SODIUM SERPL-SCNC: 135 MMOL/L
T4 FREE SERPL-MCNC: 1.5 NG/DL
TSH SERPL-ACNC: 0.1 UIU/ML

## 2024-01-30 NOTE — END OF VISIT
[FreeTextEntry3] : I, Fabien Bowman, authored this note working as a medical scribe for Dr. Euceda.  01/29/2024.  This note was authored by the medical scribe for me. I have reviewed, edited, and revised the note as needed. I am in agreement with the exam findings, imaging findings, and treatment plan.  Devin Euceda MD

## 2024-01-30 NOTE — PHYSICAL EXAM
[Alert] : alert [Well Nourished] : well nourished [No Acute Distress] : no acute distress [Well Developed] : well developed [Normal Sclera/Conjunctiva] : normal sclera/conjunctiva [EOMI] : extra ocular movement intact [No Proptosis] : no proptosis [Thyroid Not Enlarged] : the thyroid was not enlarged [No Thyroid Nodules] : no palpable thyroid nodules [Clear to Auscultation] : lungs were clear to auscultation bilaterally [Normal S1, S2] : normal S1 and S2 [Normal Rate] : heart rate was normal [Regular Rhythm] : with a regular rhythm [No Edema] : no peripheral edema [Normal Bowel Sounds] : normal bowel sounds [Not Tender] : non-tender [Not Distended] : not distended [Soft] : abdomen soft [Normal Anterior Cervical Nodes] : no anterior cervical lymphadenopathy [No Spinal Tenderness] : no spinal tenderness [Spine Straight] : spine straight [No Stigmata of Cushings Syndrome] : no stigmata of Cushings Syndrome [Normal Gait] : normal gait [Normal Reflexes] : deep tendon reflexes were 2+ and symmetric [No Tremors] : no tremors [Oriented x3] : oriented to person, place, and time [Hirsutism] : no hirsutism [de-identified] : Thinning hair crown but no true alopecia or loss of hairline

## 2024-01-30 NOTE — HISTORY OF PRESENT ILLNESS
[FreeTextEntry1] : Ms Prajapati, kiesha Wisdom, is 28 years old female who returns for follow up for hypothyroidism & PCOS. She changed her last name after marriage.  She skipped spironolactone in last 3 months. She reported having 30 pounds weight loss in the last 3 months. She is planning pregnancy around next year. Her hair loss is worse. No taking Vit D  H/o ALL dx at age 10 she is currently still in remission. Had AVN hips, knees, shoulders from chemoRx and steroids. Had R TKR at [facility] July 2015. No recent bone disease. Prior BMD normal. Had lumbar laminectomy Sept 2016 [facility]. Has back pain, sciatica to the R leg. On Vitamin D 74078 IU 3x/weekly.  Gained 30 lbs in 2016. Tried Saxenda, stopped after few months.   Hypothyroidism, on LT4 137 mcg daily decreased from 150 for suppressed TSH no change in any clinical symptoms on change in dose. No local neck pain. No dysphagia or dysphonia. No raciness, shakiness, heat/cold intolerance, tiredness, or fatigue. No palpitations, tremors, or sudden weight gain/loss. No h/o goiter or nodules.  Changed OBCP to Kelnor, menses regular. Seeing dermatology for hair thinning.  Has tried PRP injections currently on oral minoxidil from dermatology Family history patient's mother had parathyroidectomy for hyperparathyroidism details not available  Previously c/o hair thinning. No major hirsutism. Saw derm, recommended Rogaine, did not start saw Dr. Micheal Zaldivar for hair loss, added spironolactone 50 qd, did not tolerate 200 mg. She later f/u w/ another derm, added Minoxidil pill. No change in hair loss yet.  Prior blood test showed elevated ferritin. Patient saw hematology and apparently had genetic studies suggesting heterozygous for hemochromatosis. Recommended observation.  She reported that her mother underwent parathyroid surgery, and she is concerned if its correlated and runs in the family.  Labs: 03/23 Ca 10.8 PTH 68

## 2024-01-30 NOTE — ASSESSMENT
[Levothyroxine] : The patient was instructed to take Levothyroxine on an empty stomach, separate from vitamins, and wait at least 30 minutes before eating [FreeTextEntry1] : 28 year-old female with:  PCOS: Menses regular on OBCP. C/w metformin 750 mg BID. No acanthosis nigricans, no hirsutism. Pt has hair thinning with scalp visibility. Recommend topical Rogaine.  Patient has started oral minoxidil from a dermatologist.  Hypothyroidism: Clinically and chemically euthyroid on LT4 137 mcg daily. No local neck pain. No dysphagia or dysphonia. No raciness, shakiness, heat/cold intolerance, tiredness, or fatigue. No palpitations, tremors, or sudden weight gain/loss.  Obesity: Tried Saxenda in the past stopped due to worsened anxiety. Options of therapy including bariatric surgery discussed but not preferred. Again discussed use of GLP1s such as Mounjaro or Wegovy for weight loss in addition to diet and exercise. Risks and benefits discussed. All questions were answered.  Patient to consider.  We will try to investigate potential insurance coverage.  H/o low Vitamin D: Pt on 50,000 IU 3x/wk. Unclear why her patterns are of malabsorption.  Hyperparathyroidism: Last PTH 68 which is slightly elevated.   repeat PTH today.   I discussed with the patient that if she is planning pregnancy, she needs to stop taking OCPs and spironolactone, and might need to change thyroid dosage. She is advised to consult Gynecologist and take me on board when she planes her pregnancy, but she is advised to stop aldolactone and check thyroid levels right away if planning pregnancy.   Blood request to be sent. Follows up on Thyroid, PTH levels and Vit D before sending the prescription refills.  F/u in 6 months

## 2024-02-02 NOTE — ED ADULT NURSE NOTE - NSFALLRSKASSESASSIST_ED_ALL_ED
Pharmacy called, states they do not have the chewables in stock.  They would need a new Rx to change to liquid Amoxicillin.   
Spoke to mom about positive strep results.   Prescribed amoxicillin 500mg BID for 10 days.     Must complete the full course of medication as prescribed to properly treat the infection.  For pain or fever, Acetaminophen or Ibuprofen may be taken as needed alongside antibiotic.  Encourage increased fluid intake, soft foods and broadening diet as tolerated.  New toothbrushes in coming days recommended.  May go back to school when afebrile 24 hours without fever reducer and at least 24 hours of antibiotics in system.  Return if not drinking or eating well, develops neck pain or swelling, new fevers or fevers getting higher, less active/lethargic or in any respiratory distress.  Call with any questions or concerns.     
no

## 2024-08-08 ENCOUNTER — APPOINTMENT (OUTPATIENT)
Dept: ENDOCRINOLOGY | Facility: CLINIC | Age: 29
End: 2024-08-08

## 2024-08-09 ENCOUNTER — APPOINTMENT (OUTPATIENT)
Dept: ENDOCRINOLOGY | Facility: CLINIC | Age: 29
End: 2024-08-09

## 2024-08-29 ENCOUNTER — APPOINTMENT (OUTPATIENT)
Dept: ENDOCRINOLOGY | Facility: CLINIC | Age: 29
End: 2024-08-29
Payer: COMMERCIAL

## 2024-08-29 VITALS
SYSTOLIC BLOOD PRESSURE: 118 MMHG | BODY MASS INDEX: 34.72 KG/M2 | OXYGEN SATURATION: 96 % | WEIGHT: 242 LBS | HEART RATE: 96 BPM | DIASTOLIC BLOOD PRESSURE: 88 MMHG

## 2024-08-29 DIAGNOSIS — E55.9 VITAMIN D DEFICIENCY, UNSPECIFIED: ICD-10-CM

## 2024-08-29 DIAGNOSIS — E21.0 PRIMARY HYPERPARATHYROIDISM: ICD-10-CM

## 2024-08-29 DIAGNOSIS — E03.9 HYPOTHYROIDISM, UNSPECIFIED: ICD-10-CM

## 2024-08-29 PROCEDURE — 99215 OFFICE O/P EST HI 40 MIN: CPT

## 2024-08-29 NOTE — PHYSICAL EXAM
[Alert] : alert [Well Nourished] : well nourished [No Acute Distress] : no acute distress [Well Developed] : well developed [Normal Sclera/Conjunctiva] : normal sclera/conjunctiva [EOMI] : extra ocular movement intact [No Proptosis] : no proptosis [Thyroid Not Enlarged] : the thyroid was not enlarged [No Thyroid Nodules] : no palpable thyroid nodules [Clear to Auscultation] : lungs were clear to auscultation bilaterally [Normal S1, S2] : normal S1 and S2 [Normal Rate] : heart rate was normal [Regular Rhythm] : with a regular rhythm [No Edema] : no peripheral edema [Normal Bowel Sounds] : normal bowel sounds [Not Tender] : non-tender [Not Distended] : not distended [Soft] : abdomen soft [Normal Anterior Cervical Nodes] : no anterior cervical lymphadenopathy [No Spinal Tenderness] : no spinal tenderness [Spine Straight] : spine straight [No Stigmata of Cushings Syndrome] : no stigmata of Cushings Syndrome [Normal Gait] : normal gait [Normal Reflexes] : deep tendon reflexes were 2+ and symmetric [No Tremors] : no tremors [Oriented x3] : oriented to person, place, and time [Hirsutism] : no hirsutism [de-identified] : Thinning hair crown but no true alopecia or loss of hairline

## 2024-08-29 NOTE — ASSESSMENT
[Levothyroxine] : The patient was instructed to take Levothyroxine on an empty stomach, separate from vitamins, and wait at least 30 minutes before eating [FreeTextEntry1] : 89 year-old female with:  PCOS: Menses regular on OBCP. C/w metformin 750 mg BID. Patient would like to begin attempts at pregnancy 2025. The patient does not have hirsutism but does have significant hair thinning.  Previously did not respond to treatment by dermatology including Dr. Martin Zaldivar.  Stopped minoxidil No acanthosis nigricans, no hirsutism. Pt has hair thinning with scalp visibility. Recommend topical Rogaine.  Patient has started oral minoxidil from a dermatologist.  Hypothyroidism: Clinically and chemically euthyroid on LT4 137 mcg daily. No local neck pain. No dysphagia or dysphonia. No raciness, shakiness, heat/cold intolerance, tiredness, or fatigue. No palpitations, tremors, or sudden weight gain/loss.  Obesity: Tried Saxenda in the past stopped due to worsened anxiety. Options of therapy including bariatric surgery discussed but not preferred. Again discussed use of GLP1s such as Mounjaro or Wegovy for weight loss in addition to diet and exercise. Risks and benefits discussed. All questions were answered.   Patient reluctant to consider at this time because she is planning future pregnancy. H/o low Vitamin D:  Previously on high-dose vitamin D supplements and then patient stopped with a normal vitamin D as of January.  Possible hyperparathyroidism versus FHH.  I have requested repeat labs and 24-hour urine calcium for further evaluation.  If low fractional excretion of calcium patient may need genetic testing.    I discussed with the patient that if she is planning pregnancy, she needs to stop taking OCPs and spironolactone, and might need to change thyroid dosage. She is advised to consult Gynecologist and take me on board when she planes her pregnancy, but she is advised to stop aldolactone and check thyroid levels right away if planning pregnancy.   Blood request to be sent. Follows up on Thyroid, PTH levels and Vit D before sending the prescription refills.  F/u in 6 months

## 2024-08-29 NOTE — HISTORY OF PRESENT ILLNESS
[FreeTextEntry1] : Ms Prajapati, kiesha Wisdom, is 28 years old female who returns for follow up for hypothyroidism & PCOS. She changed her last name after marriage.   Hypothyroidism, on LT4 137 mcg daily decreased from 150 for suppressed TSH no change in any clinical symptoms on change in dose. No local neck pain. No dysphagia or dysphonia. No raciness, shakiness, heat/cold intolerance, tiredness, or fatigue. No palpitations, tremors, or sudden weight gain/loss. No h/o goiter or nodules. Menses remain normal.  Patient is planning pregnancy beginning January 2025.Weight stable. History of significant hair thinning.  Previously did not respond to  minoxidil. no hirsutism  History of low vitamin D in the past.  Patient has stopped taking vitamin D and most recent blood test was actually normal off therapy, 43.1 .  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 Had AVN hips, knees, shoulders from chemoRx and steroids. Had R TKR at [facility] July 2015. No recent bone disease. Prior BMD normal. Had lumbar laminectomy Sept 2016 [facility]. Has back pain, sciatica to the R leg. On Vitamin D 09736 IU 3x/weekly.   Changed OBCP to [place], menses regular. Seeing dermatology for hair thinning.  Has tried PRP injections currently on oral minoxidil from dermatology Family history patient's mother had parathyroidectomy for hyperparathyroidism details not available  Previous labs showed mild parathyroid elevation with normal calcium.  I had requested a 24-hour urine calcium to help exclude FHH but the patient has not done this yet. Prior blood test showed elevated ferritin. Patient saw hematology and apparently had genetic studies suggesting heterozygous for hemochromatosis. Recommended observation.

## 2024-08-29 NOTE — PHYSICAL EXAM
[Alert] : alert [Well Nourished] : well nourished [No Acute Distress] : no acute distress [Well Developed] : well developed [Normal Sclera/Conjunctiva] : normal sclera/conjunctiva [EOMI] : extra ocular movement intact [No Proptosis] : no proptosis [Thyroid Not Enlarged] : the thyroid was not enlarged [No Thyroid Nodules] : no palpable thyroid nodules [Clear to Auscultation] : lungs were clear to auscultation bilaterally [Normal S1, S2] : normal S1 and S2 [Normal Rate] : heart rate was normal [Regular Rhythm] : with a regular rhythm [No Edema] : no peripheral edema [Normal Bowel Sounds] : normal bowel sounds [Not Tender] : non-tender [Not Distended] : not distended [Soft] : abdomen soft [Normal Anterior Cervical Nodes] : no anterior cervical lymphadenopathy [No Spinal Tenderness] : no spinal tenderness [Spine Straight] : spine straight [No Stigmata of Cushings Syndrome] : no stigmata of Cushings Syndrome [Normal Gait] : normal gait [Normal Reflexes] : deep tendon reflexes were 2+ and symmetric [No Tremors] : no tremors [Oriented x3] : oriented to person, place, and time [Hirsutism] : no hirsutism [de-identified] : Thinning hair crown but no true alopecia or loss of hairline

## 2024-08-29 NOTE — HISTORY OF PRESENT ILLNESS
[FreeTextEntry1] : Ms Prajapati, kiesha Wisdom, is 28 years old female who returns for follow up for hypothyroidism & PCOS. She changed her last name after marriage.   Hypothyroidism, on LT4 137 mcg daily decreased from 150 for suppressed TSH no change in any clinical symptoms on change in dose. No local neck pain. No dysphagia or dysphonia. No raciness, shakiness, heat/cold intolerance, tiredness, or fatigue. No palpitations, tremors, or sudden weight gain/loss. No h/o goiter or nodules. Menses remain normal.  Patient is planning pregnancy beginning January 2025.Weight stable. History of significant hair thinning.  Previously did not respond to  minoxidil. no hirsutism  History of low vitamin D in the past.  Patient has stopped taking vitamin D and most recent blood test was actually normal off therapy, 43.1 .  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 Had AVN hips, knees, shoulders from chemoRx and steroids. Had R TKR at \[facility]\"" July 2015. No recent bone disease. Prior BMD normal. Had lumbar laminectomy Sept 2016 [facility]. Has back pain, sciatica to the R leg. On Vitamin D 45832 IU 3x/weekly.   Changed OBCP to [place], menses regular. Seeing dermatology for hair thinning.  Has tried PRP injections currently on oral minoxidil from dermatology Family history patient's mother had parathyroidectomy for hyperparathyroidism details not available  Previous labs showed mild parathyroid elevation with normal calcium.  I had requested a 24-hour urine calcium to help exclude FHH but the patient has not done this yet. Prior blood test showed elevated ferritin. Patient saw hematology and apparently had genetic studies suggesting heterozygous for hemochromatosis. Recommended observation.

## 2024-08-30 LAB
25(OH)D3 SERPL-MCNC: 38.1 NG/ML
ALBUMIN SERPL ELPH-MCNC: 4.5 G/DL
ALP BLD-CCNC: 83 U/L
ALT SERPL-CCNC: 13 U/L
ANION GAP SERPL CALC-SCNC: 12 MMOL/L
AST SERPL-CCNC: 15 U/L
BILIRUB SERPL-MCNC: 0.3 MG/DL
BUN SERPL-MCNC: 15 MG/DL
CALCIUM SERPL-MCNC: 10.1 MG/DL
CALCIUM SERPL-MCNC: 10.1 MG/DL
CHLORIDE SERPL-SCNC: 101 MMOL/L
CO2 SERPL-SCNC: 25 MMOL/L
CREAT SERPL-MCNC: 0.71 MG/DL
EGFR: 117 ML/MIN/1.73M2
GLUCOSE SERPL-MCNC: 91 MG/DL
PARATHYROID HORMONE INTACT: 56 PG/ML
PHOSPHATE SERPL-MCNC: 3.9 MG/DL
POTASSIUM SERPL-SCNC: 4.6 MMOL/L
PROT SERPL-MCNC: 7.4 G/DL
SODIUM SERPL-SCNC: 138 MMOL/L
T4 FREE SERPL-MCNC: 1.1 NG/DL
TSH SERPL-ACNC: 0.44 UIU/ML

## 2024-09-04 LAB
CAU: 16 MG/DL
CREAT 24H UR-MCNC: 1.2 G/24 H
CREAT ?TM UR-MCNC: 99 MG/DL
PROT ?TM UR-MCNC: 24 HR
SPECIMEN VOL 24H UR: 1200 ML
SPECIMEN VOL 24H UR: 192 MG/24 H

## 2024-11-10 ENCOUNTER — NON-APPOINTMENT (OUTPATIENT)
Age: 29
End: 2024-11-10

## 2025-01-15 NOTE — ED ADULT NURSE NOTE - PAIN RATING/NUMBER SCALE (0-10): ACTIVITY
0 Medical Necessity Information: It is in your best interest to select a reason for this procedure from the list below. All of these items fulfill various CMS LCD requirements except the new and changing color options. Medical Necessity Clause: This procedure was medically necessary because the lesion that was treated was: Lab: [id number] Lab Facility: [id number] Body Location Override (Optional - Billing Will Still Be Based On Selected Body Map Location If Applicable): left chest Detail Level: Detailed Was A Bandage Applied: Yes Size Of Lesion In Cm (Required): 0.8 X Size Of Lesion In Cm (Optional): 0 Depth Of Shave: dermis Biopsy Method: Dermablade Anesthesia Type: 1% lidocaine with epinephrine Hemostasis: Electrocautery Wound Care: Petrolatum Path Notes (To The Dermatopathologist): Size: 0.8cm R/O: DN vs ISK Render Path Notes In Note?: No Consent was obtained from the patient. The risks and benefits to therapy were discussed in detail. Specifically, the risks of infection, scarring, bleeding, prolonged wound healing, incomplete removal, allergy to anesthesia, nerve injury and recurrence were addressed. Prior to the procedure, the treatment site was clearly identified and confirmed by the patient. All components of Universal Protocol/PAUSE Rule completed. Post-Care Instructions: I reviewed with the patient in detail post-care instructions. Patient is to keep the biopsy site dry overnight, and then apply bacitracin twice daily until healed. Patient may apply hydrogen peroxide soaks to remove any crusting. Notification Instructions: Patient will be notified of pathology results. However, patient instructed to call the office if not contacted within 2 weeks. Billing Type: Third-Party Bill

## 2025-02-24 ENCOUNTER — TRANSCRIPTION ENCOUNTER (OUTPATIENT)
Age: 30
End: 2025-02-24

## 2025-02-24 ENCOUNTER — APPOINTMENT (OUTPATIENT)
Dept: ENDOCRINOLOGY | Facility: CLINIC | Age: 30
End: 2025-02-24
Payer: COMMERCIAL

## 2025-02-24 VITALS
SYSTOLIC BLOOD PRESSURE: 128 MMHG | HEART RATE: 84 BPM | BODY MASS INDEX: 35.79 KG/M2 | DIASTOLIC BLOOD PRESSURE: 80 MMHG | WEIGHT: 250 LBS | OXYGEN SATURATION: 98 % | HEIGHT: 70 IN

## 2025-02-24 DIAGNOSIS — E28.2 POLYCYSTIC OVARIAN SYNDROME: ICD-10-CM

## 2025-02-24 DIAGNOSIS — E21.0 PRIMARY HYPERPARATHYROIDISM: ICD-10-CM

## 2025-02-24 DIAGNOSIS — L65.9 NONSCARRING HAIR LOSS, UNSPECIFIED: ICD-10-CM

## 2025-02-24 DIAGNOSIS — E03.9 HYPOTHYROIDISM, UNSPECIFIED: ICD-10-CM

## 2025-02-24 DIAGNOSIS — E55.9 VITAMIN D DEFICIENCY, UNSPECIFIED: ICD-10-CM

## 2025-02-24 PROCEDURE — G2211 COMPLEX E/M VISIT ADD ON: CPT | Mod: NC

## 2025-02-24 PROCEDURE — 99214 OFFICE O/P EST MOD 30 MIN: CPT

## 2025-02-25 LAB
ALBUMIN SERPL ELPH-MCNC: 4.7 G/DL
ALP BLD-CCNC: 88 U/L
ALT SERPL-CCNC: 29 U/L
ANION GAP SERPL CALC-SCNC: 15 MMOL/L
AST SERPL-CCNC: 24 U/L
BILIRUB SERPL-MCNC: 0.2 MG/DL
BUN SERPL-MCNC: 17 MG/DL
CALCIUM SERPL-MCNC: 10.1 MG/DL
CHLORIDE SERPL-SCNC: 104 MMOL/L
CO2 SERPL-SCNC: 25 MMOL/L
CREAT SERPL-MCNC: 0.52 MG/DL
EGFR: 128 ML/MIN/1.73M2
GLUCOSE SERPL-MCNC: 86 MG/DL
HCG SERPL QL: NEGATIVE
PAPP-A SERPL-ACNC: <1 MIU/ML
POTASSIUM SERPL-SCNC: 5.1 MMOL/L
PROT SERPL-MCNC: 7.3 G/DL
SODIUM SERPL-SCNC: 144 MMOL/L
T4 FREE SERPL-MCNC: 1.3 NG/DL
TSH SERPL-ACNC: 0.02 UIU/ML

## 2025-03-06 NOTE — ED PROVIDER NOTE - CHIEF COMPLAINT
Received phone call from patient wondering about her prep and arrival time for her flex sig tomorrow.    Writer contacted the GI lab and patient needs to arrive at the hospital tomorrow at 0700 for a 0900 for procedure. She will be completing the enemas at the hospital. She just needs to be on clear liquids today and nothing to eat or drink after midnight.    Informed patient and she acknowledged. She states that she has been on clear liquids today and will be there tomorrow at 0700.    The patient is a 23y Female complaining of back pain general.
